# Patient Record
Sex: FEMALE | Race: WHITE | NOT HISPANIC OR LATINO | Employment: OTHER | URBAN - METROPOLITAN AREA
[De-identification: names, ages, dates, MRNs, and addresses within clinical notes are randomized per-mention and may not be internally consistent; named-entity substitution may affect disease eponyms.]

---

## 2017-08-17 ENCOUNTER — TRANSCRIBE ORDERS (OUTPATIENT)
Dept: ADMINISTRATIVE | Facility: HOSPITAL | Age: 72
End: 2017-08-17

## 2017-08-17 DIAGNOSIS — Z86.73 HX OF TIA (TRANSIENT ISCHEMIC ATTACK) AND STROKE: ICD-10-CM

## 2017-08-17 DIAGNOSIS — G93.9 BRAIN LESION: Primary | ICD-10-CM

## 2017-08-24 ENCOUNTER — HOSPITAL ENCOUNTER (OUTPATIENT)
Dept: MRI IMAGING | Facility: HOSPITAL | Age: 72
Discharge: HOME | End: 2017-08-24
Attending: INTERNAL MEDICINE | Admitting: INTERNAL MEDICINE

## 2017-08-24 DIAGNOSIS — Z86.73 HX OF TIA (TRANSIENT ISCHEMIC ATTACK) AND STROKE: ICD-10-CM

## 2017-08-24 DIAGNOSIS — G93.9 BRAIN LESION: ICD-10-CM

## 2017-08-24 LAB — CREAT BLDA-MCNC: 1.3 MG/DL (ref 0.6–1.3)

## 2017-08-24 PROCEDURE — 0 GADOBENATE DIMEGLUMINE 529 MG/ML SOLUTION: Performed by: INTERNAL MEDICINE

## 2017-08-24 PROCEDURE — 82565 ASSAY OF CREATININE: CPT

## 2017-08-24 PROCEDURE — A9577 INJ MULTIHANCE: HCPCS | Performed by: INTERNAL MEDICINE

## 2017-08-24 PROCEDURE — 70553 MRI BRAIN STEM W/O & W/DYE: CPT

## 2017-08-24 RX ADMIN — GADOBENATE DIMEGLUMINE 18 ML: 529 INJECTION, SOLUTION INTRAVENOUS at 09:21

## 2017-08-25 ENCOUNTER — EPISODE CHANGES (OUTPATIENT)
Dept: CASE MANAGEMENT | Facility: OTHER | Age: 72
End: 2017-08-25

## 2017-10-13 ENCOUNTER — EPISODE CHANGES (OUTPATIENT)
Dept: CASE MANAGEMENT | Facility: OTHER | Age: 72
End: 2017-10-13

## 2017-11-14 ENCOUNTER — EPISODE CHANGES (OUTPATIENT)
Dept: CASE MANAGEMENT | Facility: OTHER | Age: 72
End: 2017-11-14

## 2018-02-19 ENCOUNTER — EPISODE CHANGES (OUTPATIENT)
Dept: CASE MANAGEMENT | Facility: OTHER | Age: 73
End: 2018-02-19

## 2019-04-11 ENCOUNTER — APPOINTMENT (OUTPATIENT)
Dept: GENERAL RADIOLOGY | Facility: HOSPITAL | Age: 74
End: 2019-04-11

## 2019-04-11 ENCOUNTER — HOSPITAL ENCOUNTER (EMERGENCY)
Facility: HOSPITAL | Age: 74
Discharge: HOME | End: 2019-04-11
Attending: EMERGENCY MEDICINE | Admitting: EMERGENCY MEDICINE

## 2019-04-11 VITALS
RESPIRATION RATE: 16 BRPM | TEMPERATURE: 99.6 F | OXYGEN SATURATION: 94 % | HEIGHT: 63 IN | DIASTOLIC BLOOD PRESSURE: 65 MMHG | WEIGHT: 182.4 LBS | SYSTOLIC BLOOD PRESSURE: 128 MMHG | BODY MASS INDEX: 32.32 KG/M2 | HEART RATE: 61 BPM

## 2019-04-11 DIAGNOSIS — R11.10 VOMITING AND DIARRHEA: Primary | ICD-10-CM

## 2019-04-11 DIAGNOSIS — R19.7 VOMITING AND DIARRHEA: Primary | ICD-10-CM

## 2019-04-11 LAB
ALBUMIN SERPL-MCNC: 4.5 G/DL (ref 3.5–5.2)
ALBUMIN/GLOB SERPL: 1.5 G/DL
ALP SERPL-CCNC: 105 U/L (ref 39–117)
ALT SERPL W P-5'-P-CCNC: 12 U/L (ref 1–33)
ANION GAP SERPL CALCULATED.3IONS-SCNC: 15.3 MMOL/L
AST SERPL-CCNC: 14 U/L (ref 1–32)
BACTERIA UR QL AUTO: NORMAL /HPF
BASOPHILS # BLD AUTO: 0.03 10*3/MM3 (ref 0–0.2)
BASOPHILS NFR BLD AUTO: 0.4 % (ref 0–1.5)
BILIRUB SERPL-MCNC: 0.6 MG/DL (ref 0.2–1.2)
BILIRUB UR QL STRIP: NEGATIVE
BUN BLD-MCNC: 29 MG/DL (ref 8–23)
BUN/CREAT SERPL: 21.8 (ref 7–25)
CALCIUM SPEC-SCNC: 9.1 MG/DL (ref 8.6–10.5)
CHLORIDE SERPL-SCNC: 96 MMOL/L (ref 98–107)
CLARITY UR: CLEAR
CO2 SERPL-SCNC: 27.7 MMOL/L (ref 22–29)
COLOR UR: YELLOW
CREAT BLD-MCNC: 1.33 MG/DL (ref 0.57–1)
DEPRECATED RDW RBC AUTO: 44.9 FL (ref 37–54)
EOSINOPHIL # BLD AUTO: 0.14 10*3/MM3 (ref 0–0.4)
EOSINOPHIL NFR BLD AUTO: 1.8 % (ref 0.3–6.2)
ERYTHROCYTE [DISTWIDTH] IN BLOOD BY AUTOMATED COUNT: 14.1 % (ref 12.3–15.4)
GFR SERPL CREATININE-BSD FRML MDRD: 39 ML/MIN/1.73
GLOBULIN UR ELPH-MCNC: 3.1 GM/DL
GLUCOSE BLD-MCNC: 134 MG/DL (ref 65–99)
GLUCOSE UR STRIP-MCNC: NEGATIVE MG/DL
HCT VFR BLD AUTO: 43.7 % (ref 34–46.6)
HGB BLD-MCNC: 14.1 G/DL (ref 12–15.9)
HGB UR QL STRIP.AUTO: NEGATIVE
HOLD SPECIMEN: NORMAL
HOLD SPECIMEN: NORMAL
HYALINE CASTS UR QL AUTO: NORMAL /LPF
IMM GRANULOCYTES # BLD AUTO: 0.02 10*3/MM3 (ref 0–0.05)
IMM GRANULOCYTES NFR BLD AUTO: 0.3 % (ref 0–0.5)
KETONES UR QL STRIP: NEGATIVE
LEUKOCYTE ESTERASE UR QL STRIP.AUTO: ABNORMAL
LIPASE SERPL-CCNC: 19 U/L (ref 13–60)
LYMPHOCYTES # BLD AUTO: 0.8 10*3/MM3 (ref 0.7–3.1)
LYMPHOCYTES NFR BLD AUTO: 10.1 % (ref 19.6–45.3)
MCH RBC QN AUTO: 28.1 PG (ref 26.6–33)
MCHC RBC AUTO-ENTMCNC: 32.3 G/DL (ref 31.5–35.7)
MCV RBC AUTO: 87.2 FL (ref 79–97)
MONOCYTES # BLD AUTO: 0.48 10*3/MM3 (ref 0.1–0.9)
MONOCYTES NFR BLD AUTO: 6.1 % (ref 5–12)
NEUTROPHILS # BLD AUTO: 6.44 10*3/MM3 (ref 1.4–7)
NEUTROPHILS NFR BLD AUTO: 81.3 % (ref 42.7–76)
NITRITE UR QL STRIP: NEGATIVE
NRBC BLD AUTO-RTO: 0 /100 WBC (ref 0–0)
PH UR STRIP.AUTO: 7 [PH] (ref 5–8)
PLATELET # BLD AUTO: 330 10*3/MM3 (ref 140–450)
PMV BLD AUTO: 11.1 FL (ref 6–12)
POTASSIUM BLD-SCNC: 3.5 MMOL/L (ref 3.5–5.2)
PROT SERPL-MCNC: 7.6 G/DL (ref 6–8.5)
PROT UR QL STRIP: NEGATIVE
RBC # BLD AUTO: 5.01 10*6/MM3 (ref 3.77–5.28)
RBC # UR: NORMAL /HPF
REF LAB TEST METHOD: NORMAL
SODIUM BLD-SCNC: 139 MMOL/L (ref 136–145)
SP GR UR STRIP: 1.02 (ref 1–1.03)
SQUAMOUS #/AREA URNS HPF: NORMAL /HPF
TROPONIN T SERPL-MCNC: <0.01 NG/ML (ref 0–0.03)
UROBILINOGEN UR QL STRIP: ABNORMAL
WBC NRBC COR # BLD: 7.91 10*3/MM3 (ref 3.4–10.8)
WBC UR QL AUTO: NORMAL /HPF
WHOLE BLOOD HOLD SPECIMEN: NORMAL
WHOLE BLOOD HOLD SPECIMEN: NORMAL

## 2019-04-11 PROCEDURE — 81001 URINALYSIS AUTO W/SCOPE: CPT | Performed by: EMERGENCY MEDICINE

## 2019-04-11 PROCEDURE — 96374 THER/PROPH/DIAG INJ IV PUSH: CPT

## 2019-04-11 PROCEDURE — 85025 COMPLETE CBC W/AUTO DIFF WBC: CPT | Performed by: EMERGENCY MEDICINE

## 2019-04-11 PROCEDURE — 93010 ELECTROCARDIOGRAM REPORT: CPT | Performed by: INTERNAL MEDICINE

## 2019-04-11 PROCEDURE — 71046 X-RAY EXAM CHEST 2 VIEWS: CPT

## 2019-04-11 PROCEDURE — 93005 ELECTROCARDIOGRAM TRACING: CPT | Performed by: EMERGENCY MEDICINE

## 2019-04-11 PROCEDURE — 99284 EMERGENCY DEPT VISIT MOD MDM: CPT

## 2019-04-11 PROCEDURE — 25010000002 ONDANSETRON PER 1 MG: Performed by: EMERGENCY MEDICINE

## 2019-04-11 PROCEDURE — 83690 ASSAY OF LIPASE: CPT | Performed by: EMERGENCY MEDICINE

## 2019-04-11 PROCEDURE — 96361 HYDRATE IV INFUSION ADD-ON: CPT

## 2019-04-11 PROCEDURE — 84484 ASSAY OF TROPONIN QUANT: CPT | Performed by: EMERGENCY MEDICINE

## 2019-04-11 PROCEDURE — 93005 ELECTROCARDIOGRAM TRACING: CPT

## 2019-04-11 PROCEDURE — 80053 COMPREHEN METABOLIC PANEL: CPT | Performed by: EMERGENCY MEDICINE

## 2019-04-11 RX ORDER — ONDANSETRON 4 MG/1
4 TABLET, ORALLY DISINTEGRATING ORAL EVERY 8 HOURS PRN
Qty: 10 TABLET | Refills: 0 | Status: SHIPPED | OUTPATIENT
Start: 2019-04-11 | End: 2021-10-11

## 2019-04-11 RX ORDER — MELATONIN 200 MCG
3 TABLET ORAL NIGHTLY
COMMUNITY

## 2019-04-11 RX ORDER — ONDANSETRON 2 MG/ML
4 INJECTION INTRAMUSCULAR; INTRAVENOUS ONCE
Status: COMPLETED | OUTPATIENT
Start: 2019-04-11 | End: 2019-04-11

## 2019-04-11 RX ORDER — CETIRIZINE HYDROCHLORIDE, PSEUDOEPHEDRINE HYDROCHLORIDE 5; 120 MG/1; MG/1
1 TABLET, FILM COATED, EXTENDED RELEASE ORAL 2 TIMES DAILY
COMMUNITY

## 2019-04-11 RX ORDER — GUAIFENESIN AND DEXTROMETHORPHAN HYDROBROMIDE 600; 30 MG/1; MG/1
TABLET, EXTENDED RELEASE ORAL DAILY
COMMUNITY
End: 2021-10-11

## 2019-04-11 RX ORDER — ASPIRIN 81 MG/1
81 TABLET ORAL DAILY
COMMUNITY
End: 2021-10-14 | Stop reason: HOSPADM

## 2019-04-11 RX ORDER — LOSARTAN POTASSIUM AND HYDROCHLOROTHIAZIDE 25; 100 MG/1; MG/1
1 TABLET ORAL DAILY
COMMUNITY

## 2019-04-11 RX ORDER — AMLODIPINE BESYLATE 10 MG/1
10 TABLET ORAL DAILY
COMMUNITY

## 2019-04-11 RX ORDER — SODIUM CHLORIDE 0.9 % (FLUSH) 0.9 %
10 SYRINGE (ML) INJECTION AS NEEDED
Status: DISCONTINUED | OUTPATIENT
Start: 2019-04-11 | End: 2019-04-12 | Stop reason: HOSPADM

## 2019-04-11 RX ORDER — ESZOPICLONE 3 MG/1
3 TABLET, FILM COATED ORAL NIGHTLY
COMMUNITY

## 2019-04-11 RX ADMIN — ONDANSETRON 4 MG: 2 INJECTION INTRAMUSCULAR; INTRAVENOUS at 22:42

## 2019-04-11 RX ADMIN — SODIUM CHLORIDE 1000 ML: 9 INJECTION, SOLUTION INTRAVENOUS at 22:42

## 2019-04-12 NOTE — ED PROVIDER NOTES
EMERGENCY DEPARTMENT ENCOUNTER    Room Number:  15/15  Date seen:  4/11/2019  Time seen: 9:24 PM  PCP: Soila Young MD  Historian: patient      HPI:  Chief Complaint: N/V  Context: Leandra Wesley is a 73 y.o. female who presents to the ED c/o N/V that began yesterday around 2000. Pt has had N/V today but states she only had diarrhea last night. Pt also complains of chills and upper abd pain. Pt states last night prior to the sx she had left over Mexican food. Pt denies fever and bloody stools. Pt has not had a BM today. Pt denies ill contacts. Pt has had a cholecystectomy. Pt has hx of stroke but denies pertinent cardiac hx. Family at bedside.      Pain Location: abd  Intensity/Severity: moderate  Duration: one day  Onset quality: gradual  Timing: constant  Progression: unchanged  Previous Episodes: none  Treatment before arrival: none  Associated Symptoms: diarrhea, abd pain, chills          PAST MEDICAL HISTORY  Active Ambulatory Problems     Diagnosis Date Noted   • No Active Ambulatory Problems     Resolved Ambulatory Problems     Diagnosis Date Noted   • No Resolved Ambulatory Problems     Past Medical History:   Diagnosis Date   • Carotid stenosis, bilateral    • DDD (degenerative disc disease), cervical    • Diverticular disease    • GERD (gastroesophageal reflux disease)    • Hypertension    • IBS (irritable bowel syndrome)    • Intestine disorder    • Kidney stone    • Migraine    • Acevedo's neuroma of right foot    • Stroke (CMS/HCC)          PAST SURGICAL HISTORY  Past Surgical History:   Procedure Laterality Date   • BLADDER SURGERY     • CHOLECYSTECTOMY     • HYSTERECTOMY     • TONSILLECTOMY           FAMILY HISTORY  History reviewed. No pertinent family history.      SOCIAL HISTORY  Social History     Socioeconomic History   • Marital status:      Spouse name: Not on file   • Number of children: Not on file   • Years of education: Not on file   • Highest education level: Not on file   Tobacco  Use   • Smoking status: Former Smoker   • Tobacco comment: smoked as teen   Substance and Sexual Activity   • Alcohol use: No     Frequency: Never   • Drug use: No   • Sexual activity: Defer         ALLERGIES  Eggs or egg-derived products; Iodine; Penicillins; and Sulfa antibiotics        REVIEW OF SYSTEMS  Review of Systems   Constitutional: Positive for chills. Negative for diaphoresis and fever.   HENT: Negative for congestion.    Eyes: Negative for visual disturbance.   Respiratory: Negative for shortness of breath.    Cardiovascular: Negative for palpitations.   Gastrointestinal: Positive for abdominal pain (upper), diarrhea, nausea and vomiting. Negative for blood in stool.   Endocrine: Negative for polyuria.   Genitourinary: Negative for flank pain.   Musculoskeletal: Negative for joint swelling.   Skin: Negative for wound.   Neurological: Negative for seizures.   Hematological: Negative for adenopathy.   Psychiatric/Behavioral: Negative for sleep disturbance.            PHYSICAL EXAM  ED Triage Vitals   Temp Heart Rate Resp BP SpO2   04/11/19 2108 04/11/19 2108 04/11/19 2108 04/11/19 2119 04/11/19 2108   99.6 °F (37.6 °C) 113 18 130/96 98 %      Temp src Heart Rate Source Patient Position BP Location FiO2 (%)   04/11/19 2108 -- -- -- --   Tympanic             GENERAL: no acute distress  HENT: nares patent  EYES: no scleral icterus  CV: regular rhythm, normal rate  RESPIRATORY: normal effort, CTAB  ABDOMEN: soft with mild epigastric tenderness, no rebound or guarding, nondistended  MUSCULOSKELETAL: no deformity  NEURO: alert, moves all extremities, follows commands  SKIN: warm, dry    Vital signs and nursing notes reviewed.          LAB RESULTS  Recent Results (from the past 24 hour(s))   Comprehensive Metabolic Panel    Collection Time: 04/11/19  9:23 PM   Result Value Ref Range    Glucose 134 (H) 65 - 99 mg/dL    BUN 29 (H) 8 - 23 mg/dL    Creatinine 1.33 (H) 0.57 - 1.00 mg/dL    Sodium 139 136 - 145 mmol/L     Potassium 3.5 3.5 - 5.2 mmol/L    Chloride 96 (L) 98 - 107 mmol/L    CO2 27.7 22.0 - 29.0 mmol/L    Calcium 9.1 8.6 - 10.5 mg/dL    Total Protein 7.6 6.0 - 8.5 g/dL    Albumin 4.50 3.50 - 5.20 g/dL    ALT (SGPT) 12 1 - 33 U/L    AST (SGOT) 14 1 - 32 U/L    Alkaline Phosphatase 105 39 - 117 U/L    Total Bilirubin 0.6 0.2 - 1.2 mg/dL    eGFR Non African Amer 39 (L) >60 mL/min/1.73    Globulin 3.1 gm/dL    A/G Ratio 1.5 g/dL    BUN/Creatinine Ratio 21.8 7.0 - 25.0    Anion Gap 15.3 mmol/L   Lipase    Collection Time: 04/11/19  9:23 PM   Result Value Ref Range    Lipase 19 13 - 60 U/L   Troponin    Collection Time: 04/11/19  9:23 PM   Result Value Ref Range    Troponin T <0.010 0.000 - 0.030 ng/mL   Light Blue Top    Collection Time: 04/11/19  9:23 PM   Result Value Ref Range    Extra Tube hold for add-on    Green Top (Gel)    Collection Time: 04/11/19  9:23 PM   Result Value Ref Range    Extra Tube Hold for add-ons.    Lavender Top    Collection Time: 04/11/19  9:23 PM   Result Value Ref Range    Extra Tube hold for add-on    Gold Top - SST    Collection Time: 04/11/19  9:23 PM   Result Value Ref Range    Extra Tube Hold for add-ons.    CBC Auto Differential    Collection Time: 04/11/19  9:23 PM   Result Value Ref Range    WBC 7.91 3.40 - 10.80 10*3/mm3    RBC 5.01 3.77 - 5.28 10*6/mm3    Hemoglobin 14.1 12.0 - 15.9 g/dL    Hematocrit 43.7 34.0 - 46.6 %    MCV 87.2 79.0 - 97.0 fL    MCH 28.1 26.6 - 33.0 pg    MCHC 32.3 31.5 - 35.7 g/dL    RDW 14.1 12.3 - 15.4 %    RDW-SD 44.9 37.0 - 54.0 fl    MPV 11.1 6.0 - 12.0 fL    Platelets 330 140 - 450 10*3/mm3    Neutrophil % 81.3 (H) 42.7 - 76.0 %    Lymphocyte % 10.1 (L) 19.6 - 45.3 %    Monocyte % 6.1 5.0 - 12.0 %    Eosinophil % 1.8 0.3 - 6.2 %    Basophil % 0.4 0.0 - 1.5 %    Immature Grans % 0.3 0.0 - 0.5 %    Neutrophils, Absolute 6.44 1.40 - 7.00 10*3/mm3    Lymphocytes, Absolute 0.80 0.70 - 3.10 10*3/mm3    Monocytes, Absolute 0.48 0.10 - 0.90 10*3/mm3     Eosinophils, Absolute 0.14 0.00 - 0.40 10*3/mm3    Basophils, Absolute 0.03 0.00 - 0.20 10*3/mm3    Immature Grans, Absolute 0.02 0.00 - 0.05 10*3/mm3    nRBC 0.0 0.0 - 0.0 /100 WBC   Urinalysis With Microscopic If Indicated (No Culture) - Urine, Clean Catch    Collection Time: 04/11/19 10:44 PM   Result Value Ref Range    Color, UA Yellow Yellow, Straw    Appearance, UA Clear Clear    pH, UA 7.0 5.0 - 8.0    Specific Gravity, UA 1.020 1.005 - 1.030    Glucose, UA Negative Negative    Ketones, UA Negative Negative    Bilirubin, UA Negative Negative    Blood, UA Negative Negative    Protein, UA Negative Negative    Leuk Esterase, UA Trace (A) Negative    Nitrite, UA Negative Negative    Urobilinogen, UA 0.2 E.U./dL 0.2 - 1.0 E.U./dL   Urinalysis, Microscopic Only - Urine, Clean Catch    Collection Time: 04/11/19 10:44 PM   Result Value Ref Range    RBC, UA 0-2 None Seen, 0-2 /HPF    WBC, UA 0-2 None Seen, 0-2 /HPF    Bacteria, UA None Seen None Seen /HPF    Squamous Epithelial Cells, UA 0-2 None Seen, 0-2 /HPF    Hyaline Casts, UA None Seen None Seen /LPF    Methodology Automated Microscopy        Ordered the above labs and reviewed the results.        RADIOLOGY  Xr Chest 2 View    Result Date: 4/11/2019  PA AND LATERAL CHEST X-RAY  CLINICAL HISTORY: Upper Abdominal Pain triage protocol  COMPARISON: 10/27/2011.  FINDINGS: PA and lateral views of the chest were obtained. The lungs are well expanded and appear clear. Heart size and pulmonary vascularity are normal. No pleural effusions.        1. No active disease.  This report was finalized on 4/11/2019 9:42 PM by Micah Garcia M.D.        Ordered the above noted radiological studies. Reviewed by me in PACS.          PROCEDURES  Procedures        EKG:           EKG time: 2111  Rhythm/Rate: NSR 93 BPM  P waves and AZ: normal  QRS, axis: normal axis   ST and T waves: inferior and lateral ST depression of 1mm, no significant ST elevation     Interpreted  Contemporaneously by me, independently viewed  Slightly more pronounced compared to prior 9/21/11      EKG          EKG time: 2247  Rhythm/Rate: NSR 79 BPM  P waves and DC: normal  QRS, axis: normal axis   ST and T waves: inferior and lateral ST depression     Interpreted Contemporaneously by me, independently viewed  slight improved of inferior and lateral ST depression compared to prior earlier today      MEDICATIONS GIVEN IN ER  Medications   sodium chloride 0.9 % flush 10 mL (not administered)   sodium chloride 0.9 % bolus 1,000 mL (1,000 mL Intravenous New Bag 4/11/19 2242)   ondansetron (ZOFRAN) injection 4 mg (4 mg Intravenous Given 4/11/19 2242)                   PROGRESS AND CONSULTS     2109-Ordered CXR, lab work, and EKG for further evaluation.     2227-Ordered repeat EKG for further evaluation.     2320-patient updated on labs.  She has p.o. challenge successfully.  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  I explained the importance of continued oral hydration and prescribed Zofran as needed.  She is appropriate for discharge home at this time with return precautions, PCP follow-up.    MEDICAL DECISION MAKING        MDM  Number of Diagnoses or Management Options     Amount and/or Complexity of Data Reviewed  Clinical lab tests: reviewed and ordered (troponin<0.010  WBC-7.91  creatinine-1.33)  Tests in the radiology section of CPT®: reviewed and ordered (CXR-negative acute)  Tests in the medicine section of CPT®: reviewed and ordered (See EKG procedure note)  Decide to obtain previous medical records or to obtain history from someone other than the patient: yes  Independent visualization of images, tracings, or specimens: yes               DIAGNOSIS  Final diagnoses:   Vomiting and diarrhea         DISPOSITION  Discharge            Latest Documented Vital  Signs:  As of 11:25 PM  BP- 130/70 HR- 80 Temp- 99.6 °F (37.6 °C) (Tympanic) O2 sat- 100%        --  Documentation assistance provided by martin Herbert for Dr. JUDIE Hsieh MD.  Information recorded by the scribe was done at my direction and has been verified and validated by me.                      Yolanda Herbert  04/11/19 [id number]       Tim Hsieh MD  04/11/19 [id number]

## 2019-04-12 NOTE — ED NOTES
Pt to ED via PV. Pt c/o epigastric pain, nausea and vomiting that started last night.      Sherri Mejia, RN  04/11/19 [id number]

## 2021-01-04 ENCOUNTER — TRANSCRIBE ORDERS (OUTPATIENT)
Dept: ADMINISTRATIVE | Facility: HOSPITAL | Age: 76
End: 2021-01-04

## 2021-01-04 DIAGNOSIS — M54.50 LOW BACK PAIN, UNSPECIFIED BACK PAIN LATERALITY, UNSPECIFIED CHRONICITY, UNSPECIFIED WHETHER SCIATICA PRESENT: Primary | ICD-10-CM

## 2021-01-09 ENCOUNTER — HOSPITAL ENCOUNTER (OUTPATIENT)
Dept: MRI IMAGING | Facility: HOSPITAL | Age: 76
Discharge: HOME | End: 2021-01-09
Admitting: INTERNAL MEDICINE

## 2021-01-09 DIAGNOSIS — M54.50 LOW BACK PAIN, UNSPECIFIED BACK PAIN LATERALITY, UNSPECIFIED CHRONICITY, UNSPECIFIED WHETHER SCIATICA PRESENT: ICD-10-CM

## 2021-01-09 PROCEDURE — 72148 MRI LUMBAR SPINE W/O DYE: CPT

## 2021-03-09 DIAGNOSIS — Z23 IMMUNIZATION DUE: ICD-10-CM

## 2021-05-06 ENCOUNTER — TRANSCRIBE ORDERS (OUTPATIENT)
Dept: ADMINISTRATIVE | Facility: HOSPITAL | Age: 76
End: 2021-05-06

## 2021-05-06 DIAGNOSIS — R27.0 ATAXIA: Primary | ICD-10-CM

## 2021-05-06 DIAGNOSIS — Z86.73 HX OF COMPLETED STROKE: ICD-10-CM

## 2021-06-02 ENCOUNTER — HOSPITAL ENCOUNTER (EMERGENCY)
Facility: HOSPITAL | Age: 76
Discharge: HOME | End: 2021-06-02
Attending: EMERGENCY MEDICINE | Admitting: EMERGENCY MEDICINE

## 2021-06-02 ENCOUNTER — APPOINTMENT (OUTPATIENT)
Dept: GENERAL RADIOLOGY | Facility: HOSPITAL | Age: 76
End: 2021-06-02

## 2021-06-02 VITALS
WEIGHT: 182 LBS | SYSTOLIC BLOOD PRESSURE: 134 MMHG | BODY MASS INDEX: 33.49 KG/M2 | HEART RATE: 82 BPM | TEMPERATURE: 97.7 F | OXYGEN SATURATION: 98 % | DIASTOLIC BLOOD PRESSURE: 90 MMHG | RESPIRATION RATE: 18 BRPM | HEIGHT: 62 IN

## 2021-06-02 DIAGNOSIS — S30.0XXA LUMBAR CONTUSION, INITIAL ENCOUNTER: Primary | ICD-10-CM

## 2021-06-02 DIAGNOSIS — S30.0XXA CONTUSION OF SACRUM, INITIAL ENCOUNTER: ICD-10-CM

## 2021-06-02 PROCEDURE — 99283 EMERGENCY DEPT VISIT LOW MDM: CPT

## 2021-06-02 PROCEDURE — 72110 X-RAY EXAM L-2 SPINE 4/>VWS: CPT

## 2021-06-02 PROCEDURE — 72220 X-RAY EXAM SACRUM TAILBONE: CPT

## 2021-06-02 RX ORDER — HYDROCODONE BITARTRATE AND ACETAMINOPHEN 7.5; 325 MG/1; MG/1
1 TABLET ORAL ONCE
Status: COMPLETED | OUTPATIENT
Start: 2021-06-02 | End: 2021-06-02

## 2021-06-02 RX ORDER — CYCLOBENZAPRINE HCL 10 MG
10 TABLET ORAL ONCE
Status: DISCONTINUED | OUTPATIENT
Start: 2021-06-02 | End: 2021-06-02

## 2021-06-02 RX ORDER — BACLOFEN 10 MG/1
5 TABLET ORAL ONCE
Status: COMPLETED | OUTPATIENT
Start: 2021-06-02 | End: 2021-06-02

## 2021-06-02 RX ORDER — BACLOFEN 5 MG/1
5 TABLET ORAL 2 TIMES DAILY
Qty: 14 TABLET | Refills: 0 | Status: SHIPPED | OUTPATIENT
Start: 2021-06-02 | End: 2021-10-11

## 2021-06-02 RX ADMIN — HYDROCODONE BITARTRATE AND ACETAMINOPHEN 1 TABLET: 7.5; 325 TABLET ORAL at 20:48

## 2021-06-02 RX ADMIN — BACLOFEN 5 MG: 10 TABLET ORAL at 20:48

## 2021-06-02 NOTE — ED TRIAGE NOTES
Pt comes to ER after slipping and falling prior to arrival, pt denies hitting her head or LOC. Complaining of tailbone pain. Pt and RN wearing mask upon triage

## 2021-06-02 NOTE — ED PROVIDER NOTES
EMERGENCY DEPARTMENT ENCOUNTER    Room Number:  04/04  Date seen:  6/2/2021  Time seen: 19:49 EDT  PCP: Soila Young MD  Historian:     HPI:  Chief complaint:***  A complete HPI/ROS/PMH/PSH/SH/FH are unobtainable due to:   Context:Leandra Wesley is a 75 y.o. female who presents to the ED with c/o ***    Timing:***  Duration: ***  Location:***  Radiation:***  Quality:***  Intensity/Severity:***  Associated Symptoms:***  Aggravating Factors:***  Alleviating Factors:***  Previous Episodes:***  Treatment before arrival:***    MEDICAL RECORD REVIEW  ***    ALLERGIES  Eggs or egg-derived products, Iodine, Penicillins, and Sulfa antibiotics    PAST MEDICAL HISTORY  Active Ambulatory Problems     Diagnosis Date Noted   • No Active Ambulatory Problems     Resolved Ambulatory Problems     Diagnosis Date Noted   • No Resolved Ambulatory Problems     Past Medical History:   Diagnosis Date   • Carotid stenosis, bilateral    • DDD (degenerative disc disease), cervical    • Diverticular disease    • GERD (gastroesophageal reflux disease)    • Hypertension    • IBS (irritable bowel syndrome)    • Intestine disorder    • Kidney stone    • Migraine    • Acevedo's neuroma of right foot    • Stroke (CMS/HCC)        PAST SURGICAL HISTORY  Past Surgical History:   Procedure Laterality Date   • BLADDER SURGERY     • CHOLECYSTECTOMY     • HYSTERECTOMY     • TONSILLECTOMY         FAMILY HISTORY  No family history on file.    SOCIAL HISTORY  Social History     Socioeconomic History   • Marital status:      Spouse name: Not on file   • Number of children: Not on file   • Years of education: Not on file   • Highest education level: Not on file   Tobacco Use   • Smoking status: Former Smoker   • Tobacco comment: smoked as teen   Substance and Sexual Activity   • Alcohol use: No   • Drug use: No   • Sexual activity: Defer       REVIEW OF SYSTEMS  Review of Systems    All systems reviewed and negative except for those discussed in HPI.      PHYSICAL EXAM    ED Triage Vitals   Temp Heart Rate Resp BP SpO2   06/02/21 1707 06/02/21 1706 06/02/21 1706 06/02/21 1707 06/02/21 1706   97.7 °F (36.5 °C) 87 18 150/86 97 %      Temp src Heart Rate Source Patient Position BP Location FiO2 (%)   -- -- -- -- --            Physical Exam    I have reviewed the triage vital signs and nursing notes.      GENERAL: ***not distressed  HENT: nares patent  EYES: no scleral icterus  NECK: no ROM limitations  CV: regular rhythm, regular rate  RESPIRATORY: normal effort  ABDOMEN: soft  : deferred  MUSCULOSKELETAL: no deformity  NEURO: alert, moves all extremities, follows commands  SKIN: warm, dry    LAB RESULTS  No results found for this or any previous visit (from the past 24 hour(s)).      RADIOLOGY RESULTS  XR Spine Lumbar Complete 4+VW    (Results Pending)   XR Sacrum & Coccyx    (Results Pending)         PROGRESS, DATA ANALYSIS, CONSULTS AND MEDICAL DECISION MAKING  All labs have been independently reviewed by me.  All radiology studies have been reviewed by me and discussed with radiologist dictating the report.  EKG's independently viewed and interpreted by me unless stated otherwise. Discussion below represents my analysis of pertinent findings related to patient's condition, differential diagnosis, treatment plan and final disposition.          *** Reviewed pt's history and workup with  ***.  After a bedside evaluation,  *** agrees with the plan of care.    ***(FOR DISCHARGE)The patient's history, physical exam, and lab findings were discussed with the physician, who also performed a face to face history and physical exam.  I discussed all results and noted any abnormalities with patient.  Discussed absoute need to recheck abnormalities with their family physician.  I answered any of the patient's questions.  Discussed plan for discharge, as there is no emergent indication for admission.  Pt is agreeable and understands need for follow up and repeat  "testing.  Pt is aware that discharge does not mean that nothing is wrong but it indicates no emergency is present and they must continue care with their family physician.  Pt is discharged with instructions to follow up with primary care doctor to have their blood pressure rechecked.     ***(FOR AMA)The patient would like to leave Against Medical Advice.  I have explained the risks of leaving prior to completion of evaluation.  The patient verbally agrees to accept these risks, including worsening of condition, complications and death.  They were of sound mind and mentally stable to make decisions at the time of their AMA departure.  I have encouarged the patient to return the ED at any time if symptoms persist or for any additional concerns.  I verbally advised them to follow up with their primary physician or local health clinic.    ***(FOR ADMIT) Based on the patient's lab findings and presenting symptoms, the doctor and I feel it is appropriate to admit the patient for further management, evaluation, and treatment.  I have discussed this with the admitting team.  I have also discussed this with the patient/family.  They are in agreement with admission.          Disposition vitals:  /86   Pulse 87   Temp 97.7 °F (36.5 °C)   Resp 18   Ht 157.5 cm (62\")   Wt 82.6 kg (182 lb)   SpO2 97%   BMI 33.29 kg/m²       DIAGNOSIS  Final diagnoses:   None       FOLLOW UP   No follow-up provider specified.    "

## 2021-06-03 NOTE — ED PROVIDER NOTES
MD ATTESTATION NOTE    The CHRISTOS and I have discussed this patient's history, physical exam, and treatment plan.  I have reviewed the documentation and personally had a face to face interaction with the patient. I affirm the documentation and agree with the treatment and plan.  The attached note describes my personal findings.    75-year-old female who presents after mechanical fall at home where she fell on her tailbone.  She had severe pain at first, which is improved over time but she does have pain sitting and walking.  There is no deformity or instability of the pelvis.  There are no deformities of the hips or legs, and her plain films are negative.  She safe for outpatient discharge and follow-up     Colton Kwok MD  06/02/21 [id number]

## 2021-06-03 NOTE — DISCHARGE INSTRUCTIONS
Medications as ordered  For mild to moderate pain you should alternate Tylenol and Motrin every 4-6 hours  Heat or ice to back  Activity as tolerated  Gentle stretching  Follow up with pmd in 5-7 days if symptoms not improving  Return to er for numbness/tingling to legs, loss of bowel/bladder function, increased pain or any new or worsening symptoms

## 2021-06-03 NOTE — ED PROVIDER NOTES
EMERGENCY DEPARTMENT ENCOUNTER    Room Number:  04/04  Date of encounter:  6/2/2021  PCP: Soila Young MD  Historian: Patient      PPE    Patient was placed in face mask in first look. Patient was wearing facemask when I entered the room and throughout our encounter. I wore full protective equipment throughout this patient encounter including a face mask, and gloves. Hand hygiene was performed before donning protective equipment and after removal when leaving the room.        HPI:  Chief Complaint: Low back pain  A complete HPI/ROS/PMH/PSH/SH/FH are unobtainable due to: Nothing    Context: Leandra Wesley is a 75 y.o. female who arrives to the ED via private vehicle from home.  Patient presents with c/o mild, constant, achy low back, tailbone pain status post a fall prior to arrival.  Patient states that she was going to get the phone when she slipped on a wet area on the floor.  She states she landed on her butt and was able to get herself up off the floor.  Patient denies hitting her head, loss of bowel or bladder function, extremity injuries, dizziness, chest pain or shortness of breath.  She states she always has numbness and tingling to her bilateral lower extremities and this is not changed since the fall.  Patient states that nothing makes the symptoms better and movement worsens symptoms.          PAST MEDICAL HISTORY  Active Ambulatory Problems     Diagnosis Date Noted   • No Active Ambulatory Problems     Resolved Ambulatory Problems     Diagnosis Date Noted   • No Resolved Ambulatory Problems     Past Medical History:   Diagnosis Date   • Carotid stenosis, bilateral    • DDD (degenerative disc disease), cervical    • Diverticular disease    • GERD (gastroesophageal reflux disease)    • Hypertension    • IBS (irritable bowel syndrome)    • Intestine disorder    • Kidney stone    • Migraine    • Acevedo's neuroma of right foot    • Stroke (CMS/[facility])          PAST SURGICAL HISTORY  Past Surgical History:    Procedure Laterality Date   • BLADDER SURGERY     • CHOLECYSTECTOMY     • HYSTERECTOMY     • TONSILLECTOMY           FAMILY HISTORY  No family history on file.      SOCIAL HISTORY  Social History     Socioeconomic History   • Marital status:      Spouse name: Not on file   • Number of children: Not on file   • Years of education: Not on file   • Highest education level: Not on file   Tobacco Use   • Smoking status: Former Smoker   • Tobacco comment: smoked as teen   Substance and Sexual Activity   • Alcohol use: No   • Drug use: No   • Sexual activity: Defer         ALLERGIES  Eggs or egg-derived products, Iodine, Penicillins, and Sulfa antibiotics        REVIEW OF SYSTEMS  Review of Systems     All systems reviewed and negative except for those discussed in HPI.        PHYSICAL EXAM    ED Triage Vitals   Temp Heart Rate Resp BP SpO2   06/02/21 1707 06/02/21 1706 06/02/21 1706 06/02/21 1707 06/02/21 1706   97.7 °F (36.5 °C) 87 18 150/86 97 %       Physical Exam  GENERAL: Well appearing, non-toxic appearing, not distressed  HENT: normocephalic, atraumatic  EYES: no scleral icterus, PERRL  CV: regular rhythm, regular rate, no murmur  RESPIRATORY: normal effort, CTAB  ABDOMEN: soft   MUSCULOSKELETAL: no deformity  No cervical, thoracic or lumbar vertebral tenderness to palpation  No step off or crepitus noted  No cervical, thoracic or lumbar paraspinal tenderness to palpation  Left SI joint tenderness  Negative straight Leg Raises bilaterally  5/5 muscle strength with dorsiflexion and flexion  2+ DP & PT pulses bilaterally  Normal sensation to bilateral lower extremities, no saddle paresthesia  NEURO: alert, moves all extremities, follows commands, mental status normal/baseline  SKIN: warm, dry, no rash   Psych: Appropriate mood and affect  Nursing notes and vital signs reviewed      LAB RESULTS  No results found for this or any previous visit (from the past 24 hour(s)).    Ordered the above labs and  independently reviewed the results.      RADIOLOGY  XR Spine Lumbar Complete 4+VW, XR Sacrum & Coccyx    Result Date: 6/2/2021  LUMBAR SPINE X-RAYS AND SACRUM AND COCCYX X-RAYS  CLINICAL HISTORY: Patient fell. Low back pain and tailbone pain.  Lumbar spine series:  A total 7 views were obtained. There is minimal smooth dextroscoliosis. In addition, there is minimal anterolisthesis of L4 with respect to L5 that is likely due to facet joint arthropathy. The alignment is otherwise unremarkable. All of the vertebral bodies are normal in height. There is no evidence of recent or old fracture. There is mild disc space narrowing at L4-L5. Moderate disc space narrowing is present at L5-S1. The remainder the lumbar disc spaces are normal in height.  Sacrum and coccyx:  2 AP views and a lateral view demonstrate no bony abnormalities. There is no evidence of fracture. The SI joints are symmetric and are intact.  This report was finalized on 6/2/2021 8:20 PM by Dr. Timmy Tejada M.D.        I ordered the above noted radiological studies and viewed the images on the PACS system.       MEDICAL RECORD REVIEW  Medical records reviewed in epic, patient was last seen in this ER March 11, 2019 for nausea and vomiting      PROCEDURES    Procedures        DIFFERENTIAL DIAGNOSIS  Differential Diagnosis for back pain includes but is not limited to the following:  Musculoskeletal pain, contusion, Disc protrusion, Vertebral fracture, Rib fracture, Sciatica, Osteoarthritis, Spinal Stenosis, Kidney stone        PROGRESS, DATA ANALYSIS, CONSULTS, AND MEDICAL DECISION MAKING        ED Course as of Jun 02 2117   Wed Jun 02, 2021 2022 Discussed pertinent information from history and physical exam with patient.  Discussed differential diagnosis and plan for ED evaluation/work-up and treatment including x-rays of the lumbar spine and sacrum and coccyx.  All questions answered.  Patient is agreeable with this plan.  Patient was offered pain  medication however she declined at this time.      [MS]   2032 Reviewed pt's history and workup with Dr. Kwok.  After a bedside evaluation, they agree with the plan of care.          [MS]   2116 Patient updated on unremarkable x-rays done here today.  Discussed with patient that she should apply ice to her low back, tailbone for the next 24 to 48 hours with activity as tolerated.  She will be sent home with a short course of muscle relaxers as needed and was instructed to take Tylenol or ibuprofen.  Patient verbalized understanding and is agreeable to this plan.    [MS]      ED Course User Index  [MS] Katherine Chase APRN     Discussed plan for discharge, as there is no emergent indication for admission. Pt/family is agreeable and understands need for follow up and repeat testing.  Pt is aware that discharge does not mean that nothing is wrong but it indicates no emergency is present that requires admission and they must continue care with follow-up as given below or physician of their choice.   Patient/Family voiced understanding of above instructions.  Patient discharged in stable condition.    DIAGNOSIS  Final diagnoses:   Lumbar contusion, initial encounter   Contusion of sacrum, initial encounter       FOLLOW UP   Soila Young MD  [address]  [facility] [address]  [place] [address]  [phone]    Schedule an appointment as soon as possible for a visit in 1 week  If symptoms worsen      RX     Medication List      New Prescriptions    Baclofen 5 MG tablet  Take 5 mg by mouth 2 (Two) Times a Day.           Where to Get Your Medications      You can get these medications from any pharmacy    Bring a paper prescription for each of these medications  · Baclofen 5 MG tablet           MEDICATIONS GIVEN IN ED    Medications   HYDROcodone-acetaminophen (NORCO) 7.5-325 MG per tablet 1 tablet (1 tablet Oral Given 6/2/21 2048)   baclofen (LIORESAL) tablet 5 mg (5 mg Oral Given 6/2/21 2048)           COURSE &  MEDICAL DECISION MAKING  Any/All labs and Any/All Imaging studies that were ordered were reviewed and are noted above.  Results were reviewed/discussed with the patient and they were also made aware of online access.    Pt also made aware that some labs, such as cultures, will not be resulted during ER visit and follow up with PMD is necessary.        Katherine Chase, APRN  06/02/21 [id number]

## 2021-06-03 NOTE — ED NOTES
"Pt presents to ED with complaints of tailbone pain from fall this AM. Pt states she was trying to get to the phone and slipped. Pt denies hitting head or LOC. Pt aox4. Pt reports 7/10 constant pain. Pt ambulated to ED room, gait slightly unsteady. Pt reports having difficulty ambulating from \"swollen left leg\" that she's had for a few weeks. Pt states she uses a walking stick to walk around at home. Pt reports she's in pain management for 2 bulging disc. VSS. ABC's intact. BLE pulses palpable.    Pt reports she fell a week ago at PT office and had carpet burn on left knee.    This RN wore appropriate PPE. Pt wearing mask. Hand hygiene performed.       Reyes, Laverne, RN  06/02/21 2002       Reyes, Laverne, RN  06/02/21 2004       Reyes, Mena, RN  06/02/21 2012    "

## 2021-06-05 ENCOUNTER — HOSPITAL ENCOUNTER (OUTPATIENT)
Dept: MRI IMAGING | Facility: HOSPITAL | Age: 76
Discharge: HOME | End: 2021-06-05
Admitting: INTERNAL MEDICINE

## 2021-06-05 DIAGNOSIS — Z86.73 HX OF COMPLETED STROKE: ICD-10-CM

## 2021-06-05 DIAGNOSIS — R27.0 ATAXIA: ICD-10-CM

## 2021-06-05 LAB — CREAT BLDA-MCNC: 1.6 MG/DL (ref 0.6–1.3)

## 2021-06-05 PROCEDURE — 82565 ASSAY OF CREATININE: CPT

## 2021-06-05 PROCEDURE — 70553 MRI BRAIN STEM W/O & W/DYE: CPT

## 2021-06-05 PROCEDURE — 0 GADOBENATE DIMEGLUMINE 529 MG/ML SOLUTION: Performed by: INTERNAL MEDICINE

## 2021-06-05 PROCEDURE — A9577 INJ MULTIHANCE: HCPCS | Performed by: INTERNAL MEDICINE

## 2021-06-05 RX ADMIN — GADOBENATE DIMEGLUMINE 17 ML: 529 INJECTION, SOLUTION INTRAVENOUS at 14:01

## 2021-09-28 ENCOUNTER — APPOINTMENT (OUTPATIENT)
Dept: VACCINE CLINIC | Facility: HOSPITAL | Age: 76
End: 2021-09-28

## 2021-10-11 ENCOUNTER — HOSPITAL ENCOUNTER (OUTPATIENT)
Dept: GENERAL RADIOLOGY | Facility: HOSPITAL | Age: 76
Discharge: HOME | End: 2021-10-11

## 2021-10-11 ENCOUNTER — PRE-ADMISSION TESTING (OUTPATIENT)
Dept: PREADMISSION TESTING | Facility: HOSPITAL | Age: 76
End: 2021-10-11

## 2021-10-11 VITALS
HEIGHT: 63 IN | WEIGHT: 180.4 LBS | TEMPERATURE: 98.7 F | HEART RATE: 104 BPM | BODY MASS INDEX: 31.96 KG/M2 | OXYGEN SATURATION: 100 % | SYSTOLIC BLOOD PRESSURE: 123 MMHG | DIASTOLIC BLOOD PRESSURE: 78 MMHG | RESPIRATION RATE: 16 BRPM

## 2021-10-11 LAB
ABO GROUP BLD: NORMAL
ALBUMIN SERPL-MCNC: 4.2 G/DL (ref 3.5–5.2)
ALBUMIN/GLOB SERPL: 1.2 G/DL
ALP SERPL-CCNC: 122 U/L (ref 39–117)
ALT SERPL W P-5'-P-CCNC: 10 U/L (ref 1–33)
ANION GAP SERPL CALCULATED.3IONS-SCNC: 13.2 MMOL/L (ref 5–15)
APTT PPP: 28.7 SECONDS (ref 22.7–35.4)
AST SERPL-CCNC: 12 U/L (ref 1–32)
BACTERIA UR QL AUTO: ABNORMAL /HPF
BILIRUB SERPL-MCNC: 0.5 MG/DL (ref 0–1.2)
BILIRUB UR QL STRIP: NEGATIVE
BLD GP AB SCN SERPL QL: NEGATIVE
BUN SERPL-MCNC: 22 MG/DL (ref 8–23)
BUN/CREAT SERPL: 12.4 (ref 7–25)
CALCIUM SPEC-SCNC: 9.6 MG/DL (ref 8.6–10.5)
CHLORIDE SERPL-SCNC: 96 MMOL/L (ref 98–107)
CLARITY UR: CLEAR
CO2 SERPL-SCNC: 26.8 MMOL/L (ref 22–29)
COLOR UR: YELLOW
CREAT SERPL-MCNC: 1.77 MG/DL (ref 0.57–1)
DEPRECATED RDW RBC AUTO: 40.1 FL (ref 37–54)
ERYTHROCYTE [DISTWIDTH] IN BLOOD BY AUTOMATED COUNT: 13.1 % (ref 12.3–15.4)
GFR SERPL CREATININE-BSD FRML MDRD: 28 ML/MIN/1.73
GLOBULIN UR ELPH-MCNC: 3.6 GM/DL
GLUCOSE SERPL-MCNC: 120 MG/DL (ref 65–99)
GLUCOSE UR STRIP-MCNC: NEGATIVE MG/DL
HCT VFR BLD AUTO: 36.9 % (ref 34–46.6)
HGB BLD-MCNC: 12.2 G/DL (ref 12–15.9)
HGB UR QL STRIP.AUTO: NEGATIVE
HYALINE CASTS UR QL AUTO: ABNORMAL /LPF
INR PPP: 1 (ref 0.9–1.1)
KETONES UR QL STRIP: NEGATIVE
LEUKOCYTE ESTERASE UR QL STRIP.AUTO: ABNORMAL
MCH RBC QN AUTO: 27.7 PG (ref 26.6–33)
MCHC RBC AUTO-ENTMCNC: 33.1 G/DL (ref 31.5–35.7)
MCV RBC AUTO: 83.7 FL (ref 79–97)
NITRITE UR QL STRIP: NEGATIVE
PH UR STRIP.AUTO: 7 [PH] (ref 5–8)
PLATELET # BLD AUTO: 331 10*3/MM3 (ref 140–450)
PMV BLD AUTO: 10.5 FL (ref 6–12)
POTASSIUM SERPL-SCNC: 4.2 MMOL/L (ref 3.5–5.2)
PROT SERPL-MCNC: 7.8 G/DL (ref 6–8.5)
PROT UR QL STRIP: NEGATIVE
PROTHROMBIN TIME: 13 SECONDS (ref 11.7–14.2)
QT INTERVAL: 348 MS
RBC # BLD AUTO: 4.41 10*6/MM3 (ref 3.77–5.28)
RBC # UR: ABNORMAL /HPF
REF LAB TEST METHOD: ABNORMAL
RH BLD: POSITIVE
SARS-COV-2 ORF1AB RESP QL NAA+PROBE: NOT DETECTED
SODIUM SERPL-SCNC: 136 MMOL/L (ref 136–145)
SP GR UR STRIP: 1.01 (ref 1–1.03)
SQUAMOUS #/AREA URNS HPF: ABNORMAL /HPF
T&S EXPIRATION DATE: NORMAL
UROBILINOGEN UR QL STRIP: ABNORMAL
WBC # BLD AUTO: 7.9 10*3/MM3 (ref 3.4–10.8)
WBC UR QL AUTO: ABNORMAL /HPF

## 2021-10-11 PROCEDURE — 85610 PROTHROMBIN TIME: CPT

## 2021-10-11 PROCEDURE — C9803 HOPD COVID-19 SPEC COLLECT: HCPCS

## 2021-10-11 PROCEDURE — 86901 BLOOD TYPING SEROLOGIC RH(D): CPT

## 2021-10-11 PROCEDURE — 71046 X-RAY EXAM CHEST 2 VIEWS: CPT

## 2021-10-11 PROCEDURE — 81001 URINALYSIS AUTO W/SCOPE: CPT

## 2021-10-11 PROCEDURE — 93010 ELECTROCARDIOGRAM REPORT: CPT | Performed by: INTERNAL MEDICINE

## 2021-10-11 PROCEDURE — 86900 BLOOD TYPING SEROLOGIC ABO: CPT

## 2021-10-11 PROCEDURE — 36415 COLL VENOUS BLD VENIPUNCTURE: CPT

## 2021-10-11 PROCEDURE — 85730 THROMBOPLASTIN TIME PARTIAL: CPT

## 2021-10-11 PROCEDURE — 80053 COMPREHEN METABOLIC PANEL: CPT

## 2021-10-11 PROCEDURE — 87086 URINE CULTURE/COLONY COUNT: CPT

## 2021-10-11 PROCEDURE — U0005 INFEC AGEN DETEC AMPLI PROBE: HCPCS

## 2021-10-11 PROCEDURE — 93005 ELECTROCARDIOGRAM TRACING: CPT

## 2021-10-11 PROCEDURE — 85027 COMPLETE CBC AUTOMATED: CPT

## 2021-10-11 PROCEDURE — U0004 COV-19 TEST NON-CDC HGH THRU: HCPCS

## 2021-10-11 PROCEDURE — 86850 RBC ANTIBODY SCREEN: CPT

## 2021-10-11 RX ORDER — HYDROCODONE BITARTRATE AND ACETAMINOPHEN 5; 325 MG/1; MG/1
1 TABLET ORAL 3 TIMES DAILY
COMMUNITY
End: 2021-10-14 | Stop reason: HOSPADM

## 2021-10-11 NOTE — DISCHARGE INSTRUCTIONS
Take the following medications the morning of surgery: AMLODIPINE AND HYDROCODONE AS NEEDED       If you are on prescription narcotic pain medication to control your pain you may also take that medication the morning of surgery.    General Instructions:  • Do not eat solid food after midnight the night before surgery.  • You may drink clear liquids day of surgery but must stop at least one hour before your hospital arrival time.  • It is beneficial for you to have a clear drink that contains carbohydrates the day of surgery.  We suggest a 12 to 20 ounce bottle of Gatorade or Powerade for non-diabetic patients or a 12 to 20 ounce bottle of G2 or Powerade Zero for diabetic patients.     Clear liquids are liquids you can see through.  Nothing red in color.     Plain water                               Sports drinks  Sodas                                   Gelatin (Jell-O)  Fruit juices without pulp such as white grape juice and apple juice  Popsicles that contain no fruit or yogurt  Tea or coffee (no cream or milk added)  Gatorade / Powerade  G2 / Powerade Zero    • Bring any papers given to you in the doctor’s office.  • Wear clean comfortable clothes.  • Do not wear contact lenses, false eyelashes or make-up.  Bring a case for your glasses.   • Bring crutches or walker if applicable.  • Remove all piercings.  Leave jewelry and any other valuables at home.  • The Pre-Admission Testing nurse will instruct you to bring medications if unable to obtain an accurate list in Pre-Admission Testing.        If you were given a blood bank ID arm band remember to bring it with you the day of surgery.    Preventing a Surgical Site Infection:  • For 2 to 3 days before surgery, avoid shaving with a razor because the razor can irritate skin and make it easier to develop an infection.    • Any areas of open skin can increase the risk of a post-operative wound infection by allowing bacteria to enter and travel throughout the body.   Notify your surgeon if you have any skin wounds / rashes even if it is not near the expected surgical site.  The area will need assessed to determine if surgery should be delayed until it is healed.  • The night prior to surgery shower using a fresh bar of anti-bacterial soap (such as Dial) and clean washcloth.  Sleep in a clean bed with clean clothing.  Do not allow pets to sleep with you.  • Shower on the morning of surgery using a fresh bar of anti-bacterial soap (such as Dial) and clean washcloth.  Dry with a clean towel and dress in clean clothing.  • Ask your surgeon if you will be receiving antibiotics prior to surgery.  • Make sure you, your family, and all healthcare providers clean their hands with soap and water or an alcohol based hand  before caring for you or your wound.    Day of surgery:  Your arrival time is approximately two hours before your scheduled surgery time.  Upon arrival, a Pre-op nurse and Anesthesiologist will review your health history, obtain vital signs, and answer questions you may have.  The only belongings needed at this time will be a list of your home medications and if applicable your C-PAP/BI-PAP machine.  A Pre-op nurse will start an IV and you may receive medication in preparation for surgery, including something to help you relax.     Please be aware that surgery does come with discomfort.  We want to make every effort to control your discomfort so please discuss any uncontrolled symptoms with your nurse.   Your doctor will most likely have prescribed pain medications.          If you are staying overnight following surgery, you will be transported to your hospital room following the recovery period.   has all private rooms.    If you have any questions please call Pre-Admission Testing at [phone].  Deductibles and co-payments are collected on the day of service. Please be prepared to pay the required co-pay, deductible or deposit on  the day of service as defined by your plan.    Patient Education for Self-Quarantine Process    • Following your COVID testing, we strongly recommend that you wear a mask when you are with other people and practice social distancing.   • Limit your activities to only required outings.  • Wash your hands with soap and water frequently for at least 20 seconds.   • Avoid touching your eyes, nose and mouth with unwashed hands.  • Do not share anything - utensils, drinking glasses, food from the same bowl.   • Sanitize household surfaces daily. Include all high touch areas (door handles, light switches, phones, countertops, etc.)    Call your surgeon immediately if you experience any of the following symptoms:  • Sore Throat  • Shortness of Breath or difficulty breathing  • Cough  • Chills  • Body soreness or muscle pain  • Headache  • Fever  • New loss of taste or smell  • Do not arrive for your surgery ill.  Your procedure will need to be rescheduled to another time.  You will need to call your physician before the day of surgery to avoid any unnecessary exposure to hospital staff as well as other patients.

## 2021-10-12 LAB — BACTERIA SPEC AEROBE CULT: NORMAL

## 2021-10-13 ENCOUNTER — APPOINTMENT (OUTPATIENT)
Dept: GENERAL RADIOLOGY | Facility: HOSPITAL | Age: 76
End: 2021-10-13

## 2021-10-13 ENCOUNTER — ANESTHESIA EVENT (OUTPATIENT)
Dept: PERIOP | Facility: HOSPITAL | Age: 76
End: 2021-10-13

## 2021-10-13 ENCOUNTER — ANESTHESIA (OUTPATIENT)
Dept: PERIOP | Facility: HOSPITAL | Age: 76
End: 2021-10-13

## 2021-10-13 ENCOUNTER — HOSPITAL ENCOUNTER (OUTPATIENT)
Facility: HOSPITAL | Age: 76
Discharge: HOME | End: 2021-10-14
Attending: ORTHOPAEDIC SURGERY | Admitting: ORTHOPAEDIC SURGERY

## 2021-10-13 DIAGNOSIS — M48.02 CERVICAL STENOSIS OF SPINAL CANAL: Primary | ICD-10-CM

## 2021-10-13 PROCEDURE — 25010000002 DEXAMETHASONE PER 1 MG: Performed by: NURSE ANESTHETIST, CERTIFIED REGISTERED

## 2021-10-13 PROCEDURE — 25010000002 MIDAZOLAM PER 1 MG: Performed by: ANESTHESIOLOGY

## 2021-10-13 PROCEDURE — C1713 ANCHOR/SCREW BN/BN,TIS/BN: HCPCS | Performed by: ORTHOPAEDIC SURGERY

## 2021-10-13 PROCEDURE — 76000 FLUOROSCOPY <1 HR PHYS/QHP: CPT

## 2021-10-13 PROCEDURE — 25010000002 SUCCINYLCHOLINE PER 20 MG: Performed by: NURSE ANESTHETIST, CERTIFIED REGISTERED

## 2021-10-13 PROCEDURE — A9270 NON-COVERED ITEM OR SERVICE: HCPCS | Performed by: ORTHOPAEDIC SURGERY

## 2021-10-13 PROCEDURE — 72040 X-RAY EXAM NECK SPINE 2-3 VW: CPT

## 2021-10-13 PROCEDURE — C1889 IMPLANT/INSERT DEVICE, NOC: HCPCS | Performed by: ORTHOPAEDIC SURGERY

## 2021-10-13 PROCEDURE — 63710000001 HYDROCODONE-ACETAMINOPHEN 10-325 MG TABLET: Performed by: ORTHOPAEDIC SURGERY

## 2021-10-13 PROCEDURE — 25010000002 PROPOFOL 10 MG/ML EMULSION: Performed by: NURSE ANESTHETIST, CERTIFIED REGISTERED

## 2021-10-13 PROCEDURE — 25010000002 HYDROMORPHONE PER 4 MG: Performed by: NURSE ANESTHETIST, CERTIFIED REGISTERED

## 2021-10-13 PROCEDURE — 25010000002 HYDROMORPHONE 1 MG/ML SOLUTION: Performed by: ORTHOPAEDIC SURGERY

## 2021-10-13 PROCEDURE — 25010000002 FENTANYL CITRATE (PF) 50 MCG/ML SOLUTION: Performed by: NURSE ANESTHETIST, CERTIFIED REGISTERED

## 2021-10-13 PROCEDURE — 25010000003 CEFAZOLIN IN DEXTROSE 2-4 GM/100ML-% SOLUTION: Performed by: ORTHOPAEDIC SURGERY

## 2021-10-13 PROCEDURE — 63710000001 HYDROCODONE-ACETAMINOPHEN 7.5-325 MG TABLET: Performed by: NURSE ANESTHETIST, CERTIFIED REGISTERED

## 2021-10-13 PROCEDURE — C1762 CONN TISS, HUMAN(INC FASCIA): HCPCS | Performed by: ORTHOPAEDIC SURGERY

## 2021-10-13 PROCEDURE — G0378 HOSPITAL OBSERVATION PER HR: HCPCS

## 2021-10-13 PROCEDURE — A9270 NON-COVERED ITEM OR SERVICE: HCPCS | Performed by: NURSE ANESTHETIST, CERTIFIED REGISTERED

## 2021-10-13 PROCEDURE — 25010000002 PHENYLEPHRINE 10 MG/ML SOLUTION: Performed by: NURSE ANESTHETIST, CERTIFIED REGISTERED

## 2021-10-13 DEVICE — DEV WND/CLS CONTRL TISS STRATAFIX SPIRAL MNCRYL SH 3/0 20CM: Type: IMPLANTABLE DEVICE | Site: SPINE CERVICAL | Status: FUNCTIONAL

## 2021-10-13 DEVICE — ALLOGRFT MORSELIZED CANC TRINITY ELITE LG: Type: IMPLANTABLE DEVICE | Site: SPINE CERVICAL | Status: FUNCTIONAL

## 2021-10-13 DEVICE — IMPLANTABLE DEVICE: Type: IMPLANTABLE DEVICE | Site: SPINE CERVICAL | Status: FUNCTIONAL

## 2021-10-13 DEVICE — FLOSEAL HEMOSTATIC MATRIX, 10ML
Type: IMPLANTABLE DEVICE | Site: SPINE CERVICAL | Status: FUNCTIONAL
Brand: FLOSEAL HEMOSTATIC MATRIX

## 2021-10-13 DEVICE — SCRW VAR ANT/CERV OZARK SLF/STRT 4X14MM: Type: IMPLANTABLE DEVICE | Site: SPINE CERVICAL | Status: FUNCTIONAL

## 2021-10-13 DEVICE — CAGE INTRBDY CERV CASCADIA 3D POR/TI 7DEG 13X16X7MM: Type: IMPLANTABLE DEVICE | Site: SPINE CERVICAL | Status: FUNCTIONAL

## 2021-10-13 RX ORDER — FLUMAZENIL 0.1 MG/ML
0.2 INJECTION INTRAVENOUS AS NEEDED
Status: DISCONTINUED | OUTPATIENT
Start: 2021-10-13 | End: 2021-10-13 | Stop reason: HOSPADM

## 2021-10-13 RX ORDER — PROPOFOL 10 MG/ML
VIAL (ML) INTRAVENOUS AS NEEDED
Status: DISCONTINUED | OUTPATIENT
Start: 2021-10-13 | End: 2021-10-13 | Stop reason: SURG

## 2021-10-13 RX ORDER — BISACODYL 5 MG/1
5 TABLET, DELAYED RELEASE ORAL DAILY PRN
Status: DISCONTINUED | OUTPATIENT
Start: 2021-10-13 | End: 2021-10-14 | Stop reason: HOSPADM

## 2021-10-13 RX ORDER — SODIUM CHLORIDE 0.9 % (FLUSH) 0.9 %
10 SYRINGE (ML) INJECTION AS NEEDED
Status: DISCONTINUED | OUTPATIENT
Start: 2021-10-13 | End: 2021-10-14 | Stop reason: HOSPADM

## 2021-10-13 RX ORDER — FAMOTIDINE 10 MG/ML
20 INJECTION, SOLUTION INTRAVENOUS ONCE
Status: COMPLETED | OUTPATIENT
Start: 2021-10-13 | End: 2021-10-13

## 2021-10-13 RX ORDER — MIDAZOLAM HYDROCHLORIDE 1 MG/ML
0.5 INJECTION INTRAMUSCULAR; INTRAVENOUS
Status: DISCONTINUED | OUTPATIENT
Start: 2021-10-13 | End: 2021-10-13 | Stop reason: HOSPADM

## 2021-10-13 RX ORDER — HYDRALAZINE HYDROCHLORIDE 20 MG/ML
5 INJECTION INTRAMUSCULAR; INTRAVENOUS
Status: DISCONTINUED | OUTPATIENT
Start: 2021-10-13 | End: 2021-10-13 | Stop reason: HOSPADM

## 2021-10-13 RX ORDER — HYDROCODONE BITARTRATE AND ACETAMINOPHEN 10; 325 MG/1; MG/1
1 TABLET ORAL EVERY 4 HOURS PRN
Status: DISCONTINUED | OUTPATIENT
Start: 2021-10-13 | End: 2021-10-14 | Stop reason: HOSPADM

## 2021-10-13 RX ORDER — CEFAZOLIN SODIUM 2 G/100ML
2 INJECTION, SOLUTION INTRAVENOUS EVERY 8 HOURS
Status: COMPLETED | OUTPATIENT
Start: 2021-10-13 | End: 2021-10-14

## 2021-10-13 RX ORDER — SODIUM CHLORIDE, SODIUM LACTATE, POTASSIUM CHLORIDE, CALCIUM CHLORIDE 600; 310; 30; 20 MG/100ML; MG/100ML; MG/100ML; MG/100ML
9 INJECTION, SOLUTION INTRAVENOUS CONTINUOUS
Status: DISCONTINUED | OUTPATIENT
Start: 2021-10-13 | End: 2021-10-14 | Stop reason: HOSPADM

## 2021-10-13 RX ORDER — MAGNESIUM HYDROXIDE 1200 MG/15ML
LIQUID ORAL AS NEEDED
Status: DISCONTINUED | OUTPATIENT
Start: 2021-10-13 | End: 2021-10-13 | Stop reason: HOSPADM

## 2021-10-13 RX ORDER — SUCCINYLCHOLINE CHLORIDE 20 MG/ML
INJECTION INTRAMUSCULAR; INTRAVENOUS AS NEEDED
Status: DISCONTINUED | OUTPATIENT
Start: 2021-10-13 | End: 2021-10-13 | Stop reason: SURG

## 2021-10-13 RX ORDER — ONDANSETRON 2 MG/ML
4 INJECTION INTRAMUSCULAR; INTRAVENOUS ONCE AS NEEDED
Status: DISCONTINUED | OUTPATIENT
Start: 2021-10-13 | End: 2021-10-13 | Stop reason: HOSPADM

## 2021-10-13 RX ORDER — LABETALOL HYDROCHLORIDE 5 MG/ML
5 INJECTION, SOLUTION INTRAVENOUS
Status: DISCONTINUED | OUTPATIENT
Start: 2021-10-13 | End: 2021-10-13 | Stop reason: HOSPADM

## 2021-10-13 RX ORDER — ONDANSETRON 2 MG/ML
4 INJECTION INTRAMUSCULAR; INTRAVENOUS EVERY 6 HOURS PRN
Status: DISCONTINUED | OUTPATIENT
Start: 2021-10-13 | End: 2021-10-14 | Stop reason: HOSPADM

## 2021-10-13 RX ORDER — SODIUM CHLORIDE 0.9 % (FLUSH) 0.9 %
3-10 SYRINGE (ML) INJECTION AS NEEDED
Status: DISCONTINUED | OUTPATIENT
Start: 2021-10-13 | End: 2021-10-13 | Stop reason: HOSPADM

## 2021-10-13 RX ORDER — LORAZEPAM 2 MG/ML
1 INJECTION INTRAMUSCULAR EVERY 6 HOURS PRN
Status: DISCONTINUED | OUTPATIENT
Start: 2021-10-13 | End: 2021-10-14 | Stop reason: HOSPADM

## 2021-10-13 RX ORDER — BISACODYL 10 MG
10 SUPPOSITORY, RECTAL RECTAL DAILY PRN
Status: DISCONTINUED | OUTPATIENT
Start: 2021-10-13 | End: 2021-10-14 | Stop reason: HOSPADM

## 2021-10-13 RX ORDER — HYDROCODONE BITARTRATE AND ACETAMINOPHEN 7.5; 325 MG/1; MG/1
1 TABLET ORAL ONCE AS NEEDED
Status: COMPLETED | OUTPATIENT
Start: 2021-10-13 | End: 2021-10-13

## 2021-10-13 RX ORDER — DIPHENHYDRAMINE HCL 25 MG
25 CAPSULE ORAL
Status: DISCONTINUED | OUTPATIENT
Start: 2021-10-13 | End: 2021-10-13 | Stop reason: HOSPADM

## 2021-10-13 RX ORDER — PHENYLEPHRINE HYDROCHLORIDE 10 MG/ML
INJECTION INTRAVENOUS AS NEEDED
Status: DISCONTINUED | OUTPATIENT
Start: 2021-10-13 | End: 2021-10-13 | Stop reason: SURG

## 2021-10-13 RX ORDER — DIPHENHYDRAMINE HYDROCHLORIDE 50 MG/ML
12.5 INJECTION INTRAMUSCULAR; INTRAVENOUS
Status: DISCONTINUED | OUTPATIENT
Start: 2021-10-13 | End: 2021-10-13 | Stop reason: HOSPADM

## 2021-10-13 RX ORDER — SODIUM CHLORIDE 0.9 % (FLUSH) 0.9 %
3 SYRINGE (ML) INJECTION EVERY 12 HOURS SCHEDULED
Status: DISCONTINUED | OUTPATIENT
Start: 2021-10-13 | End: 2021-10-13 | Stop reason: HOSPADM

## 2021-10-13 RX ORDER — FENTANYL CITRATE 50 UG/ML
50 INJECTION, SOLUTION INTRAMUSCULAR; INTRAVENOUS
Status: DISCONTINUED | OUTPATIENT
Start: 2021-10-13 | End: 2021-10-13 | Stop reason: HOSPADM

## 2021-10-13 RX ORDER — HYDROCODONE BITARTRATE AND ACETAMINOPHEN 5; 325 MG/1; MG/1
1 TABLET ORAL EVERY 4 HOURS PRN
Status: DISCONTINUED | OUTPATIENT
Start: 2021-10-13 | End: 2021-10-14 | Stop reason: HOSPADM

## 2021-10-13 RX ORDER — CEFAZOLIN SODIUM 2 G/100ML
2 INJECTION, SOLUTION INTRAVENOUS ONCE
Status: COMPLETED | OUTPATIENT
Start: 2021-10-13 | End: 2021-10-13

## 2021-10-13 RX ORDER — LIDOCAINE HYDROCHLORIDE 10 MG/ML
0.5 INJECTION, SOLUTION EPIDURAL; INFILTRATION; INTRACAUDAL; PERINEURAL ONCE AS NEEDED
Status: DISCONTINUED | OUTPATIENT
Start: 2021-10-13 | End: 2021-10-13 | Stop reason: HOSPADM

## 2021-10-13 RX ORDER — FENTANYL CITRATE 50 UG/ML
INJECTION, SOLUTION INTRAMUSCULAR; INTRAVENOUS AS NEEDED
Status: DISCONTINUED | OUTPATIENT
Start: 2021-10-13 | End: 2021-10-13 | Stop reason: SURG

## 2021-10-13 RX ORDER — LIDOCAINE HYDROCHLORIDE 20 MG/ML
INJECTION, SOLUTION INFILTRATION; PERINEURAL AS NEEDED
Status: DISCONTINUED | OUTPATIENT
Start: 2021-10-13 | End: 2021-10-13 | Stop reason: SURG

## 2021-10-13 RX ORDER — ROCURONIUM BROMIDE 10 MG/ML
INJECTION, SOLUTION INTRAVENOUS AS NEEDED
Status: DISCONTINUED | OUTPATIENT
Start: 2021-10-13 | End: 2021-10-13 | Stop reason: SURG

## 2021-10-13 RX ORDER — NALOXONE HCL 0.4 MG/ML
0.4 VIAL (ML) INJECTION
Status: DISCONTINUED | OUTPATIENT
Start: 2021-10-13 | End: 2021-10-14 | Stop reason: HOSPADM

## 2021-10-13 RX ORDER — HYDROMORPHONE HYDROCHLORIDE 1 MG/ML
0.5 INJECTION, SOLUTION INTRAMUSCULAR; INTRAVENOUS; SUBCUTANEOUS
Status: DISCONTINUED | OUTPATIENT
Start: 2021-10-13 | End: 2021-10-13 | Stop reason: HOSPADM

## 2021-10-13 RX ORDER — CHOLECALCIFEROL (VITAMIN D3) 125 MCG
5 CAPSULE ORAL NIGHTLY PRN
Status: DISCONTINUED | OUTPATIENT
Start: 2021-10-13 | End: 2021-10-14 | Stop reason: HOSPADM

## 2021-10-13 RX ORDER — EPHEDRINE SULFATE 50 MG/ML
5 INJECTION, SOLUTION INTRAVENOUS ONCE AS NEEDED
Status: DISCONTINUED | OUTPATIENT
Start: 2021-10-13 | End: 2021-10-13 | Stop reason: HOSPADM

## 2021-10-13 RX ORDER — PROMETHAZINE HYDROCHLORIDE 25 MG/1
25 TABLET ORAL ONCE AS NEEDED
Status: DISCONTINUED | OUTPATIENT
Start: 2021-10-13 | End: 2021-10-13 | Stop reason: HOSPADM

## 2021-10-13 RX ORDER — POLYETHYLENE GLYCOL 3350 17 G/17G
17 POWDER, FOR SOLUTION ORAL DAILY PRN
Status: DISCONTINUED | OUTPATIENT
Start: 2021-10-13 | End: 2021-10-14 | Stop reason: HOSPADM

## 2021-10-13 RX ORDER — NALOXONE HCL 0.4 MG/ML
0.2 VIAL (ML) INJECTION AS NEEDED
Status: DISCONTINUED | OUTPATIENT
Start: 2021-10-13 | End: 2021-10-13 | Stop reason: HOSPADM

## 2021-10-13 RX ORDER — DOCUSATE SODIUM 100 MG/1
100 CAPSULE, LIQUID FILLED ORAL 2 TIMES DAILY PRN
Status: DISCONTINUED | OUTPATIENT
Start: 2021-10-13 | End: 2021-10-14 | Stop reason: HOSPADM

## 2021-10-13 RX ORDER — SODIUM CHLORIDE, SODIUM LACTATE, POTASSIUM CHLORIDE, CALCIUM CHLORIDE 600; 310; 30; 20 MG/100ML; MG/100ML; MG/100ML; MG/100ML
INJECTION, SOLUTION INTRAVENOUS CONTINUOUS PRN
Status: DISCONTINUED | OUTPATIENT
Start: 2021-10-13 | End: 2021-10-13 | Stop reason: SURG

## 2021-10-13 RX ORDER — OXYCODONE AND ACETAMINOPHEN 10; 325 MG/1; MG/1
1 TABLET ORAL EVERY 4 HOURS PRN
Status: DISCONTINUED | OUTPATIENT
Start: 2021-10-13 | End: 2021-10-13 | Stop reason: HOSPADM

## 2021-10-13 RX ORDER — SODIUM CHLORIDE 0.9 % (FLUSH) 0.9 %
3 SYRINGE (ML) INJECTION EVERY 12 HOURS SCHEDULED
Status: DISCONTINUED | OUTPATIENT
Start: 2021-10-13 | End: 2021-10-14 | Stop reason: HOSPADM

## 2021-10-13 RX ORDER — PROMETHAZINE HYDROCHLORIDE 25 MG/1
25 SUPPOSITORY RECTAL ONCE AS NEEDED
Status: DISCONTINUED | OUTPATIENT
Start: 2021-10-13 | End: 2021-10-13 | Stop reason: HOSPADM

## 2021-10-13 RX ORDER — AMOXICILLIN 250 MG
1 CAPSULE ORAL NIGHTLY PRN
Status: DISCONTINUED | OUTPATIENT
Start: 2021-10-13 | End: 2021-10-14 | Stop reason: HOSPADM

## 2021-10-13 RX ORDER — DEXAMETHASONE SODIUM PHOSPHATE 10 MG/ML
INJECTION INTRAMUSCULAR; INTRAVENOUS AS NEEDED
Status: DISCONTINUED | OUTPATIENT
Start: 2021-10-13 | End: 2021-10-13 | Stop reason: SURG

## 2021-10-13 RX ORDER — CARISOPRODOL 350 MG/1
350 TABLET ORAL 4 TIMES DAILY PRN
Status: DISCONTINUED | OUTPATIENT
Start: 2021-10-13 | End: 2021-10-14 | Stop reason: HOSPADM

## 2021-10-13 RX ORDER — ACETAMINOPHEN 325 MG/1
650 TABLET ORAL ONCE AS NEEDED
Status: DISCONTINUED | OUTPATIENT
Start: 2021-10-13 | End: 2021-10-13 | Stop reason: HOSPADM

## 2021-10-13 RX ADMIN — ROCURONIUM BROMIDE 30 MG: 50 INJECTION INTRAVENOUS at 15:06

## 2021-10-13 RX ADMIN — FENTANYL CITRATE 50 MCG: 50 INJECTION INTRAMUSCULAR; INTRAVENOUS at 17:37

## 2021-10-13 RX ADMIN — PROPOFOL 150 MG: 10 INJECTION, EMULSION INTRAVENOUS at 13:09

## 2021-10-13 RX ADMIN — MIDAZOLAM 0.5 MG: 1 INJECTION INTRAMUSCULAR; INTRAVENOUS at 12:41

## 2021-10-13 RX ADMIN — HYDROMORPHONE HYDROCHLORIDE 0.5 MG: 1 INJECTION, SOLUTION INTRAMUSCULAR; INTRAVENOUS; SUBCUTANEOUS at 19:12

## 2021-10-13 RX ADMIN — PHENYLEPHRINE HYDROCHLORIDE 200 MCG: 10 INJECTION, SOLUTION INTRAVENOUS at 14:50

## 2021-10-13 RX ADMIN — FAMOTIDINE 20 MG: 10 INJECTION, SOLUTION INTRAVENOUS at 11:34

## 2021-10-13 RX ADMIN — PHENYLEPHRINE HYDROCHLORIDE 100 MCG: 10 INJECTION, SOLUTION INTRAVENOUS at 14:22

## 2021-10-13 RX ADMIN — PHENYLEPHRINE HYDROCHLORIDE 100 MCG: 10 INJECTION, SOLUTION INTRAVENOUS at 13:38

## 2021-10-13 RX ADMIN — SODIUM CHLORIDE, POTASSIUM CHLORIDE, SODIUM LACTATE AND CALCIUM CHLORIDE 9 ML/HR: 600; 310; 30; 20 INJECTION, SOLUTION INTRAVENOUS at 23:06

## 2021-10-13 RX ADMIN — SUGAMMADEX 200 MG: 100 INJECTION, SOLUTION INTRAVENOUS at 16:27

## 2021-10-13 RX ADMIN — FENTANYL CITRATE 50 MCG: 0.05 INJECTION, SOLUTION INTRAMUSCULAR; INTRAVENOUS at 13:07

## 2021-10-13 RX ADMIN — CEFAZOLIN SODIUM 2 G: 2 INJECTION, SOLUTION INTRAVENOUS at 20:49

## 2021-10-13 RX ADMIN — PHENYLEPHRINE HYDROCHLORIDE 100 MCG: 10 INJECTION, SOLUTION INTRAVENOUS at 15:07

## 2021-10-13 RX ADMIN — HYDROMORPHONE HYDROCHLORIDE 0.5 MG: 1 INJECTION, SOLUTION INTRAMUSCULAR; INTRAVENOUS; SUBCUTANEOUS at 17:55

## 2021-10-13 RX ADMIN — PHENYLEPHRINE HYDROCHLORIDE 100 MCG: 10 INJECTION, SOLUTION INTRAVENOUS at 14:32

## 2021-10-13 RX ADMIN — SODIUM CHLORIDE, POTASSIUM CHLORIDE, SODIUM LACTATE AND CALCIUM CHLORIDE 9 ML/HR: 600; 310; 30; 20 INJECTION, SOLUTION INTRAVENOUS at 11:34

## 2021-10-13 RX ADMIN — FENTANYL CITRATE 50 MCG: 0.05 INJECTION, SOLUTION INTRAMUSCULAR; INTRAVENOUS at 13:25

## 2021-10-13 RX ADMIN — PHENYLEPHRINE HYDROCHLORIDE 100 MCG: 10 INJECTION, SOLUTION INTRAVENOUS at 15:51

## 2021-10-13 RX ADMIN — HYDROCODONE BITARTRATE AND ACETAMINOPHEN 1 TABLET: 7.5; 325 TABLET ORAL at 18:18

## 2021-10-13 RX ADMIN — SODIUM CHLORIDE, PRESERVATIVE FREE 3 ML: 5 INJECTION INTRAVENOUS at 20:51

## 2021-10-13 RX ADMIN — DEXAMETHASONE SODIUM PHOSPHATE 10 MG: 10 INJECTION INTRAMUSCULAR; INTRAVENOUS at 13:39

## 2021-10-13 RX ADMIN — HYDROMORPHONE HYDROCHLORIDE 1 MG: 1 INJECTION, SOLUTION INTRAMUSCULAR; INTRAVENOUS; SUBCUTANEOUS at 23:36

## 2021-10-13 RX ADMIN — SUCCINYLCHOLINE CHLORIDE 140 MG: 20 INJECTION, SOLUTION INTRAMUSCULAR; INTRAVENOUS; PARENTERAL at 13:09

## 2021-10-13 RX ADMIN — SODIUM CHLORIDE, POTASSIUM CHLORIDE, SODIUM LACTATE AND CALCIUM CHLORIDE: 600; 310; 30; 20 INJECTION, SOLUTION INTRAVENOUS at 16:16

## 2021-10-13 RX ADMIN — FENTANYL CITRATE 50 MCG: 0.05 INJECTION, SOLUTION INTRAMUSCULAR; INTRAVENOUS at 15:06

## 2021-10-13 RX ADMIN — PHENYLEPHRINE HYDROCHLORIDE 100 MCG: 10 INJECTION, SOLUTION INTRAVENOUS at 15:11

## 2021-10-13 RX ADMIN — SODIUM CHLORIDE, POTASSIUM CHLORIDE, SODIUM LACTATE AND CALCIUM CHLORIDE: 600; 310; 30; 20 INJECTION, SOLUTION INTRAVENOUS at 13:14

## 2021-10-13 RX ADMIN — FENTANYL CITRATE 50 MCG: 0.05 INJECTION, SOLUTION INTRAMUSCULAR; INTRAVENOUS at 16:13

## 2021-10-13 RX ADMIN — PHENYLEPHRINE HYDROCHLORIDE 200 MCG: 10 INJECTION, SOLUTION INTRAVENOUS at 14:41

## 2021-10-13 RX ADMIN — HYDROCODONE BITARTRATE AND ACETAMINOPHEN 1 TABLET: 10; 325 TABLET ORAL at 20:49

## 2021-10-13 RX ADMIN — LIDOCAINE HYDROCHLORIDE 100 MG: 20 INJECTION, SOLUTION INFILTRATION; PERINEURAL at 13:09

## 2021-10-13 RX ADMIN — PROPOFOL 150 MCG/KG/MIN: 10 INJECTION, EMULSION INTRAVENOUS at 13:15

## 2021-10-13 RX ADMIN — PHENYLEPHRINE HYDROCHLORIDE 100 MCG: 10 INJECTION, SOLUTION INTRAVENOUS at 13:51

## 2021-10-13 RX ADMIN — HYDROMORPHONE HYDROCHLORIDE 0.5 MG: 1 INJECTION, SOLUTION INTRAMUSCULAR; INTRAVENOUS; SUBCUTANEOUS at 18:15

## 2021-10-13 RX ADMIN — PROPOFOL 100 MCG/KG/MIN: 10 INJECTION, EMULSION INTRAVENOUS at 14:23

## 2021-10-13 RX ADMIN — CEFAZOLIN SODIUM 2 G: 2 INJECTION, SOLUTION INTRAVENOUS at 12:56

## 2021-10-13 RX ADMIN — ROCURONIUM BROMIDE 20 MG: 50 INJECTION INTRAVENOUS at 13:34

## 2021-10-13 NOTE — PERIOPERATIVE NURSING NOTE
Clarified with MD the cervical levels for surgery, as they were not written out on consent order. New consent reads: anterior cervical discectomy and fusion C4 to C5, C5 to C6, C6 to C7 with allograft bone. MD confirmed, pt confirmed.

## 2021-10-13 NOTE — ANESTHESIA POSTPROCEDURE EVALUATION
"Patient: Leandra Wesley    Procedure Summary     Date: 10/13/21 Room / Location: [facility] OR  / [facility] MAIN OR    Anesthesia Start: 1302 Anesthesia Stop: 1729    Procedure: ANTERIOR CERVICAL DISCECTOMY AND FUSION C4 -C7 WITH APPLICATION OF ALLOGRAFT BONE (N/A Spine Cervical) Diagnosis:     Surgeons: Timmy Gaytan MD Provider: Danielle Hernandez MD    Anesthesia Type: general ASA Status: 3          Anesthesia Type: general    Vitals  Vitals Value Taken Time   /63 10/13/21 1911   Temp 36.6 °C (97.9 °F) 10/13/21 1730   Pulse 79 10/13/21 1924   Resp 16 10/13/21 1900   SpO2 100 % 10/13/21 1925   Vitals shown include unvalidated device data.        Post Anesthesia Care and Evaluation    Patient location during evaluation: PACU  Patient participation: complete - patient cannot participate  Pain management: adequate  Airway patency: patent  Anesthetic complications: No anesthetic complications    Cardiovascular status: acceptable  Respiratory status: acceptable  Hydration status: acceptable    Comments: /70   Pulse 87   Temp 36.6 °C (97.9 °F) (Oral)   Resp 16   Ht 160 cm (63\")   Wt 82.1 kg (181 lb 1.6 oz)   SpO2 99%   BMI 32.08 kg/m²     Patient discharged before being seen by an Anesthesiologist.  No anesthetic complications noted per record.      "

## 2021-10-13 NOTE — PROGRESS NOTES
"Ortho Spine  S/p C4-7 ACDF    Patient resting comfortably in PACU.       /61 (BP Location: Right arm, Patient Position: Lying)   Pulse 77   Temp 97.8 °F (36.6 °C) (Oral)   Resp 18   Ht 160 cm (63\")   Wt 82.1 kg (181 lb 1.6 oz)   SpO2 99% Comment: post sedation  BMI 32.08 kg/m²   BUE - Neuro intact.      LLE - Baseline left foot drop    A/P  Tx to floor  Admit to observation  SCDs for DVT ppx  Hard cervical collar  PT eval and treat  24 hrs IV abx    Timmy Gaytan MD    "

## 2021-10-13 NOTE — ANESTHESIA PROCEDURE NOTES
Peripheral IV    Patient location during procedure: OR  Start time: 10/13/2021 1:15 PM  Line placed for Fluids/Medication Admin.  Performed By   Anesthesiologist: Trenton Lucas MD  Preanesthetic Checklist  Completed: patient identified, IV checked, site marked, risks and benefits discussed, surgical consent, monitors and equipment checked and pre-op evaluation  Peripheral IV Prep   Patient position: supine   Prep: ChloraPrep  Patient monitoring: heart rate and continuous pulse ox  Peripheral IV Procedure   Laterality:left  Location:  Antecubital  Catheter size: 18 G         Post Assessment   Dressing Type: tape and transparent.    IV Dressing/Site: clean, dry and intact

## 2021-10-13 NOTE — ANESTHESIA PROCEDURE NOTES
Airway  Urgency: elective    Date/Time: 10/13/2021 1:12 PM    General Information and Staff    Patient location during procedure: OR  Anesthesiologist: Trenton Lucas MD  CRNA: Dinorah Ruiz CRNA    Indications and Patient Condition  Indications for airway management: airway protection    Preoxygenated: yes  Mask difficulty assessment: 1 - vent by mask    Final Airway Details  Final airway type: endotracheal airway      Successful airway: ETT and NIM tube  Cuffed: yes   Successful intubation technique: video laryngoscopy  Facilitating devices/methods: intubating stylet  Endotracheal tube insertion site: oral  Blade: CMAC  Blade size: D  ETT size (mm): 7.0  Cormack-Lehane Classification: grade IIa - partial view of glottis  Placement verified by: chest auscultation and capnometry   Measured from: lips  Number of attempts at approach: 1  Assessment: lips, teeth, and gum same as pre-op and atraumatic intubation    Additional Comments  L front incisor noted to be chipped prior to induction. No change to dentition. CMAC utilized to ensure neck stayed neutral throughout induction and for NIMS tube verification

## 2021-10-13 NOTE — ANESTHESIA PREPROCEDURE EVALUATION
Anesthesia Evaluation     Patient summary reviewed and Nursing notes reviewed   NPO Solid Status: > 8 hours  NPO Liquid Status: > 2 hours           Airway   Mallampati: II  TM distance: >3 FB  Neck ROM: full  Dental - normal exam     Pulmonary - negative pulmonary ROS and normal exam   Cardiovascular - normal exam    ECG reviewed    (+) hypertension,  carotid artery disease carotid bilateral      Neuro/Psych  (+) CVA, numbness,     GI/Hepatic/Renal/Endo    (+)  GERD,      Musculoskeletal     Abdominal    Substance History - negative use     OB/GYN negative ob/gyn ROS         Other   arthritis,                      Anesthesia Plan    ASA 3     general       Anesthetic plan, all risks, benefits, and alternatives have been provided, discussed and informed consent has been obtained with: patient.

## 2021-10-13 NOTE — OP NOTE
CERVICAL DISCECTOMY ANTERIOR FUSION WITH INSTRUMENTATION  Procedure Report    Patient Name:  Leandra Wesley  YOB: 1945    Date of Surgery:  10/13/2021     Indications: This is a 75-year-old female who presented my office with balance and difficulty walking.  She had weakness to left foot dorsiflexion and was using an AFO.  Imaging work-up including MRI of the cervical, thoracic and lumbar spine showed mild degenerative findings in the lumbar and thoracic spine but significant stenosis spanning C4-C7 in the cervical spine with cord compression.  We discussed treatment options and alternatives with her.  Patient had tried conservative therapy.  We did discuss with her the natural history of cervical myelopathy and expectations.  We discussed best available evidence.  We did discussed with her that the goals of this procedure would be to keep her from worsening functionally and neurologically.  She wanted to proceed with surgical intervention.  After careful review of her imaging studies including x-ray and MRI an anterior approach was discussed and offered.  We discussed spanning the levels of stenosis from C4-C7 with anterior cervical discectomy and fusion.  We discussed the risks of this procedure which include but not limited to risk of anesthesia including heart attack stroke and even death as well as risk of the procedure including neurologic injury, bleeding, infection, paralysis, epidural hematoma, blood clot formation, spinal fluid leak, continued pain, pseudoarthrosis, and potential need for reoperation.  Patient understood these risks and agreed to proceed.  Informed consent was signed.    Pre-op Diagnosis:   Cervical stenosis  Cervical myelopathy       Post-Op Diagnosis Codes:  As above    Procedure/CPT® Codes:  20155  Anterior cervical discectomy and fusion  29751 59 modifier for 3 interspaces  84375 59 modifier 3 level application of interbody device  54703  Instrumentation 2-3 vertebral  segments  20930 Applicaton of allograft bone  20936 Applicaton of autograft bone    Procedure(s):  ANTERIOR CERVICAL DISCECTOMY AND FUSION C4 -C7 WITH APPLICATION OF ALLOGRAFT BONE    Staff:  Surgeon(s):  Timmy Gaytan MD    Assistant: Kathleen Mota PA-C    Anesthesia: General    Estimated Blood Loss: 50cc    Implants:    Implant Name Type Inv. Item Serial No.  Lot No. LRB No. Used Action   DEV WND/CLS CONTRL TISS STRATAFIX SPIRAL MNCRYL SH 3/0 20CM - [id number] Implant DEV WND/CLS CONTRL TISS STRATAFIX SPIRAL MNCRYL SH 3/0 20CM  [employer]  DIV [employer] J [employer] J [id number] N/A 1 Implanted   KT SEAL HEMOS ABS FLOSEAL MATRX FAST/PREP 10ML - [id number] Implant KT SEAL HEMOS ABS FLOSEAL MATRX FAST/PREP 10ML  [facility] [id number] N/A 1 Implanted   ALLOGRFT MORSELIZED CANC YANE ELITE LG - [id number] Implant ALLOGRFT MORSELIZED CANC YANE ELITE LG  ORTHOFIX N/A N/A 1 Implanted   CAGE INTRBDY CERV CASCADIA 3D POR/TI 7DEG [id number] - [id number] Implant CAGE INTRBDY CERV CASCADIA 3D POR/TI 7DEG [id number]  K2M [id number] N/A 1 Implanted   CASCADIA CERVICAL INTERBODY SYSTEM Implant   K2M [id number] N/A 1 Implanted   CASCADIA INTERBODY SYSTEM    K2M [id number] N/A 1 Implanted   PLT CERV OZARK 3LVL 60MM - [id number] Implant PLT CERV OZARK 3LVL 60MM  K2M N/A N/A 1 Implanted   SCRW ALFREDO ANT/CERV OZARK SLF/STRT 4X14MM - [id number] Implant SCRW ALFREDO ANT/CERV OZARK SLF/STRT 4X14MM  K2M N/A N/A 8 Implanted       Specimen:          None        Findings: Cervical stenosis    Complications: None    Description of Procedure: Patient was identified the preoperative holding area.  The surgical site was marked.  Patient was brought back to the operative suite by anesthesia.  General endotracheal anesthesia was induced without difficulty.  Neuro monitoring was used throughout the case including MEP's, SSEPs and EMGs.  Signals were stable throughout.  The patient was placed supine on the operative table.  The shoulders were  taped as to aid in visualization.  All bony prominences were well-padded.  Sequential compression devices were used for DVT prophylaxis throughout the case.  A timeout was performed identifying patient characteristics.  Preoperative antibiotics were given.  An initial x-ray was taken with C arm fluoroscopy verifying alignment in the incision site.  Patient was then prepped and draped in usual sterile manner.  Preincision motors were stable with noted weakness to left ankle dorsiflexion that was baseline.  An incision was made left of the midline in accordance with our localizing C arm x-ray.  This was done using a 10 blade.  Bovie electrocautery was used to dissect the subcu layer.  The platysma layer was incised.  Sharp dissection was used to remove the fascial layers retracting the midline structures medial.  The anterior osteophytes were then palpated.  The prevertebral fascial layer was incised longitudinally.  We verified the appropriate level under C arm fluoroscopy.  We then continued our exposure and dissection undermined the longus coli bilaterally extending the exposure from C4-C7.  Distraction pins were placed in the C4 and C6 vertebral bodies.  We started at C5-6.  A self-retaining retractor was then placed.  The outer annulus was incised using a 15 blade.  The operative microscope was brought into the field.  We used a series of Kerrison rongeurs, cervical curettes and pituitary rongeurs to remove disc material working her way to the back of the vertebral body.  Significant osteophytes were encountered and removed with Kerrison rongeurs.  High-speed 3 mm matchstick bur was also used to perform the discectomy and decompression.  Upon completion of the decompression the canal was decompressed both centrally and in the foramen bilaterally.  Hemostasis was achieved using FloSeal.  We then trialed up to a size 7 interbody cage.  We held off placing the cage initially due to the ability to maintain  visualization of the remaining disc levels.  We then turned our attention to C4-5.  In a similar manner as above the annulus was incised using a 15 blade.  Kerrison rongeurs, cervical curettes and pituitary rongeurs were used to perform the discectomy working her way to the back of the vertebral bodies.  Kerrison rongeur was used to remove posterior osteophytes.  Upon completion of the decompression both foramen and the central canal were well decompressed and free of compression.  Hemostasis was achieved using FloSeal.  We trialed a size 5 mm interbody cage.  A K2 M [place] interbody cage that was filled with cellular morselized allograft bone matrix as well as local autograft bone preserved from the decompression was inserted.  It was felt that be stable fit.  We then removed our distraction screws.  We then placed the distraction screws in the C7 and C6 vertebral bodies exposing the C6-7 intervertebral space.  As above the discectomy was performed in similar manner by incising the outer annulus with a 15 blade.  Combination of high-speed bur, Kerrison rongeurs, cervical curettes and pituitary rongeurs were used to remove disc material and osteophytes working her way to the back of the vertebral bodies.  Posterior osteophytes were then removed.  Upon completion of the decompression the canal was decompressed centrally as well as in the foramen bilateral.  Hemostasis was achieved using FloSeal.  We trialed up to a size 6 mm interbody cage.  We inserted a K2 M [place] size 6 interbody cage that was filled with cellular morselized allograft bone matrix as well as local autograft bone preserved from the decompression.  Upon insertion and was felt to be of good fit and fill into the disc space.  We then placed distraction screws spanning C5-6 thus distracting the disc space once more.  We irrigated with copious amounts normal saline.  Hemostasis was achieved using FloSeal.  We then inserted a K2 M size 7 interbody  cage [place].  This was filled with cellular morselized allograft bone matrix as well as local autograft bone preserved from the decompression.  The cage was inserted with good fit and fill.  We verified the cages were appropriately placed under lateral C arm fluoroscopy.  We then selected a size 60 mm [place] anterior cervical plate.  The plate was bent to accommodate the patient's lordosis.  The plate was placed anterior to the cervical spine.  A size 14 mm locking screws were placed at each level of the plate including C4, C5, C6 and C7.  High-speed drill was used for  hole and the screws were inserted through the plate and a good bite was felt.  The screws were then locked through the plate using the locking mechanism.  The wound was then irrigated with copious amounts of normal saline.  Hemostasis was achieved using bipolar electrocautery.  The wound was inspected carefully for any bleeding.  A 10 Amharic Hemovac drain was then inserted.  4 mg of dexamethasone was used and placed anterior the plate to avoid postop dysphagia.  The wound was then closed in a layered fashion.  A final motor was run and showed stable signals compared to baseline.  A sterile dressing was then applied.  Patient was placed in a hard cervical collar.  She was then placed on the hospital bed.  She was woken from anesthesia and brought back to recovery in stable condition having tolerated the procedure well.    Patient will be admitted for observation.  She will receive 24 hours IV antibiotics.  We will plan on discharge possibly postop day 1.  She will follow-up in about 3 weeks for x-ray.  She will wear the hard cervical collar at most times.     Assistant: Kathleen Mota PA-C  was responsible for performing the following activities: Retraction, Suction, Irrigation, Suturing, Closing and Placing Dressing and their skilled assistance was necessary for the success of this case.    Timmy Gaytan MD     Date: 10/13/2021  Time:  17:02 EDT

## 2021-10-13 NOTE — H&P
"    Patient Care Team:  Soila Young MD as PCP - General  Soila Young MD as PCP - Family Medicine    Chief complaint balance changes. Left foot drop    Subjective     Patient is a 75 y.o. female presents with the above complaint.  She was seen and evaluated in my office.  Complete spinal MRI shows stenosis high grade in the cervical spine C4-7.  She is here today for surgical intervention after conservative measures have failed.       Review of Systems   Pertinent items are noted in HPI    History  Past Medical History:   Diagnosis Date   • Carotid stenosis     STATES 60 PERCENT BOTH SIDES. FOLLOWED YEARLY DR OCASIO   • Cervical disc disorder    • Chronic back pain    • DDD (degenerative disc disease), cervical    • Diverticular disease    • Foot drop, left foot     WEARS BRACE LEFT LEG   • GERD (gastroesophageal reflux disease)    • History of anemia    • History of kidney stones    • History of migraine    • Hypertension    • IBS (irritable bowel syndrome)    • Intestine disorder     intestines twisted, did not require surgical intervention, \"it straightened itself out\"   • Acevedo's neuroma of right foot     had surgery   • Seasonal allergies    • Stroke (HCC)     \"i re walk to my right\" ; left sided weakness     Past Surgical History:   Procedure Laterality Date   • BLADDER SURGERY     • CATARACT EXTRACTION W/ INTRAOCULAR LENS IMPLANT      LEFT AND RIGHT   • CHOLECYSTECTOMY     • COLONOSCOPY     • FOOT NEUROMA SURGERY Right     4TH TOE   • HYSTERECTOMY     • TONSILLECTOMY       Family History   Problem Relation Age of Onset   • Malig Hyperthermia Neg Hx      Social History     Tobacco Use   • Smoking status: Former Smoker   • Smokeless tobacco: Never Used   • Tobacco comment: smoked as teen   Vaping Use   • Vaping Use: Never used   Substance Use Topics   • Alcohol use: No   • Drug use: No     E-cigarette/Vaping   • E-cigarette/Vaping Use Never User      E-cigarette/Vaping Substances     Medications Prior to " Admission   Medication Sig Dispense Refill Last Dose   • amLODIPine (NORVASC) 10 MG tablet Take 10 mg by mouth Daily.   10/13/2021 at 0830   • cetirizine-pseudoephedrine (ZyrTEC-D) 5-120 MG per 12 hr tablet Take 1 tablet by mouth 2 (Two) Times a Day.   10/12/2021 at 2200   • Esomeprazole Magnesium (NEXIUM PO) Take 20 mg by mouth Every Evening.   10/12/2021 at 2200   • eszopiclone (LUNESTA) 3 MG tablet Take 3 mg by mouth Every Night. Take immediately before bedtime   10/12/2021 at 2200   • HYDROcodone-acetaminophen (NORCO) 5-325 MG per tablet Take 1 tablet by mouth 3 (Three) Times a Day.   10/12/2021 at 1800   • losartan-hydrochlorothiazide (HYZAAR) 100-25 MG per tablet Take 1 tablet by mouth Daily.   10/13/2021 at 0830   • aspirin 81 MG EC tablet Take 81 mg by mouth Daily. WILL HOLD FOR SURGERY   10/11/2021   • Melatonin 3 MG tablet Take 3 mg by mouth Every Night.   10/9/2021     Allergies:  Eggs or egg-derived products, Iodine, Penicillins, and Sulfa antibiotics    Objective     Vital Signs  Temp:  [97.8 °F (36.6 °C)] 97.8 °F (36.6 °C)  Heart Rate:  [77-79] 77  Resp:  [18-20] 18  BP: (122)/(61) 122/61    Physical Exam:    Neuro status at baseline with left foot drop. breathing non labored. Abdomen non distended.     Results Review:    I reviewed the patient's new clinical results.    Assessment/Plan       Cervical stenosis of spinal canal      Plan for C4-7 ACDF    I discussed the patients findings and my recommendations with patient and family.     Timmy Gaytan MD  10/13/21  12:47 EDT

## 2021-10-14 VITALS
TEMPERATURE: 97.5 F | OXYGEN SATURATION: 100 % | DIASTOLIC BLOOD PRESSURE: 74 MMHG | HEIGHT: 63 IN | SYSTOLIC BLOOD PRESSURE: 132 MMHG | BODY MASS INDEX: 32.09 KG/M2 | HEART RATE: 69 BPM | RESPIRATION RATE: 16 BRPM | WEIGHT: 181.1 LBS

## 2021-10-14 PROBLEM — Z86.73 HISTORY OF CVA (CEREBROVASCULAR ACCIDENT): Status: ACTIVE | Noted: 2021-10-14

## 2021-10-14 PROCEDURE — G0378 HOSPITAL OBSERVATION PER HR: HCPCS

## 2021-10-14 PROCEDURE — 63710000001 HYDROCODONE-ACETAMINOPHEN 10-325 MG TABLET: Performed by: ORTHOPAEDIC SURGERY

## 2021-10-14 PROCEDURE — 25010000003 CEFAZOLIN IN DEXTROSE 2-4 GM/100ML-% SOLUTION: Performed by: ORTHOPAEDIC SURGERY

## 2021-10-14 PROCEDURE — 97162 PT EVAL MOD COMPLEX 30 MIN: CPT

## 2021-10-14 PROCEDURE — 25010000002 ONDANSETRON PER 1 MG: Performed by: ORTHOPAEDIC SURGERY

## 2021-10-14 PROCEDURE — A9270 NON-COVERED ITEM OR SERVICE: HCPCS | Performed by: ORTHOPAEDIC SURGERY

## 2021-10-14 PROCEDURE — 97110 THERAPEUTIC EXERCISES: CPT

## 2021-10-14 RX ORDER — DOCUSATE SODIUM 100 MG/1
100 CAPSULE, LIQUID FILLED ORAL 2 TIMES DAILY PRN
Qty: 30 CAPSULE | Refills: 0 | Status: SHIPPED | OUTPATIENT
Start: 2021-10-14

## 2021-10-14 RX ORDER — HYDROCODONE BITARTRATE AND ACETAMINOPHEN 5; 325 MG/1; MG/1
1 TABLET ORAL EVERY 4 HOURS PRN
Qty: 56 TABLET | Refills: 0 | Status: CANCELLED | OUTPATIENT
Start: 2021-10-14 | End: 2021-10-20

## 2021-10-14 RX ORDER — BISACODYL 5 MG/1
5 TABLET, DELAYED RELEASE ORAL DAILY PRN
Qty: 30 TABLET | Refills: 0 | Status: SHIPPED | OUTPATIENT
Start: 2021-10-14

## 2021-10-14 RX ORDER — HYDROCODONE BITARTRATE AND ACETAMINOPHEN 10; 325 MG/1; MG/1
1 TABLET ORAL EVERY 4 HOURS PRN
Qty: 56 TABLET | Refills: 0 | Status: SHIPPED | OUTPATIENT
Start: 2021-10-14 | End: 2021-10-23

## 2021-10-14 RX ORDER — CARISOPRODOL 350 MG/1
350 TABLET ORAL 4 TIMES DAILY PRN
Qty: 30 TABLET | Refills: 0 | Status: SHIPPED | OUTPATIENT
Start: 2021-10-14 | End: 2021-10-22

## 2021-10-14 RX ORDER — AMOXICILLIN 250 MG
1 CAPSULE ORAL NIGHTLY PRN
Qty: 30 TABLET | Refills: 0 | Status: SHIPPED | OUTPATIENT
Start: 2021-10-14

## 2021-10-14 RX ADMIN — ONDANSETRON 4 MG: 2 INJECTION INTRAMUSCULAR; INTRAVENOUS at 07:06

## 2021-10-14 RX ADMIN — HYDROCODONE BITARTRATE AND ACETAMINOPHEN 1 TABLET: 10; 325 TABLET ORAL at 11:01

## 2021-10-14 RX ADMIN — CEFAZOLIN SODIUM 2 G: 2 INJECTION, SOLUTION INTRAVENOUS at 04:00

## 2021-10-14 RX ADMIN — SODIUM CHLORIDE, PRESERVATIVE FREE 3 ML: 5 INJECTION INTRAVENOUS at 08:01

## 2021-10-14 RX ADMIN — HYDROCODONE BITARTRATE AND ACETAMINOPHEN 1 TABLET: 10; 325 TABLET ORAL at 07:06

## 2021-10-14 RX ADMIN — HYDROCODONE BITARTRATE AND ACETAMINOPHEN 1 TABLET: 10; 325 TABLET ORAL at 02:09

## 2021-10-14 NOTE — CASE MANAGEMENT/SOCIAL WORK
Case Management Discharge Note      Final Note: Home.         Selected Continued Care - Discharged on 10/14/2021 Admission date: 10/13/2021 - Discharge disposition: Home or Self Care    Destination    No services have been selected for the patient.              Durable Medical Equipment    No services have been selected for the patient.              Dialysis/Infusion    No services have been selected for the patient.              Home Medical Care    No services have been selected for the patient.              Therapy    No services have been selected for the patient.              Community Resources    No services have been selected for the patient.              Community & DME    No services have been selected for the patient.                       Final Discharge Disposition Code: 01 - home or self-care

## 2021-10-14 NOTE — PLAN OF CARE
Goal Outcome Evaluation:               Pt A&Ox4, VSS, and pain well controlled with PRN meds. Pt being D/C's home with . D/C instructions, prescriptions, and equipment reviewed and sent with patient.

## 2021-10-14 NOTE — PROGRESS NOTES
Orthopedic Progress Note    Subjective :   Patient seen and examined today.  Patient is resting comfortably in hospital bed.  Patient cervical collar is intact.  JAZMYN drain intact with no drainage.  Patient denies numbness or tingling of either upper extremity.  Patient denies weakness of either upper extremity.  Patient states minimal pain of the neck.  Patient denies difficulty swallowing.  Patient has a baseline left foot drop.    Objective :    Vital signs in last 24 hours:  Temp:  [97 °F (36.1 °C)-97.9 °F (36.6 °C)] 97 °F (36.1 °C)  Heart Rate:  [71-97] 71  Resp:  [14-20] 16  BP: (120-139)/(58-79) 128/58  Vitals:    10/13/21 2001 10/13/21 2100 10/13/21 2307 10/14/21 0355   BP: 126/72 121/62 120/71 128/58   BP Location: Right arm Right arm Right arm Right arm   Patient Position: Lying Lying Lying Lying   Pulse: 86 82 78 71   Resp: 16 16 16 16   Temp: 97 °F (36.1 °C) 97.2 °F (36.2 °C) 97 °F (36.1 °C) 97 °F (36.1 °C)   TempSrc: Oral Oral Oral Oral   SpO2: 100% 100% 100% 100%   Weight:       Height:           PHYSICAL EXAM:  Patient is calm, in no acute distress, awake and oriented x 3.  Dressing clean, dry and intact.  JAZMYN drain intact without drainage.  No signs of infection.  Swelling is appropriate.  No ecchymosis.  Full range of motion of the elbow, wrist, hand.  Intact brachial radialis, ECRL, FCR, FDS, interossei.  Intact radial, median, ulnar nerves.  Equal sensation to light touch bilaterally C3-T1 dermatomes.  Patient is neurovascularly intact distally.      LABS:  Results from last 7 days   Lab Units 10/11/21  1030   WBC 10*3/mm3 7.90   HEMOGLOBIN g/dL 12.2   HEMATOCRIT % 36.9   PLATELETS 10*3/mm3 331     Results from last 7 days   Lab Units 10/11/21  1030   SODIUM mmol/L 136   POTASSIUM mmol/L 4.2   CHLORIDE mmol/L 96*   CO2 mmol/L 26.8   BUN mg/dL 22   CREATININE mg/dL 1.77*   GLUCOSE mg/dL 120*   CALCIUM mg/dL 9.6     Results from last 7 days   Lab Units 10/11/21  1030   INR  1.00   APTT seconds 28.7        ASSESSMENT:  Status post ACDF C4-7 with allograft, postop day 01  Plan:  JAZMYN drain removed.  Surgical dressing dry, clean, intact.  Continue Physical Therapy, avoid range of motion of the cervical spine.  Maintain cervical collar.  Continue SCDs for DVT prophylaxis.  Dispo planning for home when medically stable, likely today.  Patient will follow up in Dr. Gaytan's office in 2-3 weeks.    Kathleen Mota PA-C    Date: 10/14/2021  Time: 06:57 EDT

## 2021-10-14 NOTE — PLAN OF CARE
Goal Outcome Evaluation:  Plan of Care Reviewed With: patient        Progress: no change  Outcome Summary: Pt is a 74 yo female s/p anterior cervical discectomy and fusion of C4-7 with allograft bone 10/13/21. Aspen collar worn throughout session. Pt reports she lives with  in house with 2 DAMARI, was using a cane and L AFO for foot drop, and was independent with mobility prior to admission. Pt has walker at home if needed. Pt currently transfers and ambulates 60ft with walker with SBA demonstrating impaired gait from L foot drop and impaired strength. Pt could benefit from skilled PT to address deficits to improve functional mobility. PT recommends  PT to facilitate progress    Patient was not wearing a face mask during this therapy encounter. Therapist used appropriate personal protective equipment including eye protection, mask, and gloves.  Mask used was standard procedure mask. Appropriate PPE was worn during the entire therapy session. Hand hygiene was completed before and after therapy session. Patient is not in enhanced droplet precautions.

## 2021-10-14 NOTE — DISCHARGE SUMMARY
"    Discharge Summary    Date of Admission: 10/13/2021  9:54 AM  Date of Discharge:  10/14/2021    Discharge Diagnosis:   Cervical stenosis of spinal canal [M48.02]    PMHX:   Past Medical History:   Diagnosis Date   • Carotid stenosis     STATES 60 PERCENT BOTH SIDES. FOLLOWED YEARLY DR OCASIO   • Cervical disc disorder    • Chronic back pain    • DDD (degenerative disc disease), cervical    • Diverticular disease    • Foot drop, left foot     WEARS BRACE LEFT LEG   • GERD (gastroesophageal reflux disease)    • History of anemia    • History of kidney stones    • History of migraine    • Hypertension    • IBS (irritable bowel syndrome)    • Intestine disorder     intestines twisted, did not require surgical intervention, \"it straightened itself out\"   • Acevedo's neuroma of right foot     had surgery   • Seasonal allergies    • Stroke (HCC)     \"i re walk to my right\" ; left sided weakness       Discharge Disposition  Home or Self Care    Procedures Performed  Procedure(s):  ANTERIOR CERVICAL DISCECTOMY AND FUSION C4 -C7 WITH APPLICATION OF ALLOGRAFT BONE       Indication for Admission  Patient is a 75 y.o. female with a long standing history of cervical spine pain and radiculopathy. Patient was found to be a candidate for ACDF C4-7. Risks,benefits, outcomes discussed and the patient wished to proceed. Obtained pre-operative medical clearance and was found medically stable to undergo the above procedure. The patient tolerated the procedure well and was admitted after undergoing the above surgical procedure. They were admitted for post-operative pain control, medical management and physical therapy. Antibiotics per SCIP protocol was immediately started. Patient's post-operative pain was controlled, laboratory studies and vital signs remained stable and was found medically stable for dischage.    Consults:   Consults     Date and Time Order Name Status Description    10/13/2021  7:39 PM Inpatient Hospitalist Consult  "           Discharge Instructions:  Patient is to wear cervical collar.  Patient is to limit range of motion of cervical spine.  No Driving for 6 weeks.  The dressing is waterproof, and the patient may shower.  Keep dressing in place until post op day 7. Dressing may be changed if saturated or starts to fall off.  Patient will follow-up in the office in 2-3 weeks.  Call the office at [phone] for any questions or concerns.      Discharge Medications: Per the discharge medication reconciliation list.    Discharge Diet: Regular home diet    Activity at Discharge: Patient to remain in cervical collar at all times until seen in the office for post-operative follow up except for bathing and range of motion exercises.     Follow-up Appointments: Follow-up in Dr. Gaytan's office in 2-3 weeks. Please call [phone] to schedule or confirm your appointment.     Kathleen Mota PA-C  10/14/21,  07:04 EDT

## 2021-10-14 NOTE — THERAPY EVALUATION
"Patient Name: Leandra Wesley  : 1945    MRN: [id number]                              Today's Date: 10/14/2021       Admit Date: 10/13/2021    Visit Dx:     ICD-10-CM ICD-9-CM   1. Cervical stenosis of spinal canal  M48.02 723.0     Patient Active Problem List   Diagnosis   • Cervical stenosis of spinal canal   • History of CVA (cerebrovascular accident)   • Hypertension   • GERD (gastroesophageal reflux disease)   • Carotid stenosis   • CKD (chronic kidney disease) stage 3, GFR 30-59 ml/min ([facility])     Past Medical History:   Diagnosis Date   • Carotid stenosis     STATES 60 PERCENT BOTH SIDES. FOLLOWED YEARLY DR OCASIO   • Cervical disc disorder    • Chronic back pain    • DDD (degenerative disc disease), cervical    • Diverticular disease    • Foot drop, left foot     WEARS BRACE LEFT LEG   • GERD (gastroesophageal reflux disease)    • History of anemia    • History of kidney stones    • History of migraine    • Hypertension    • IBS (irritable bowel syndrome)    • Intestine disorder     intestines twisted, did not require surgical intervention, \"it straightened itself out\"   • Acevedo's neuroma of right foot     had surgery   • Seasonal allergies    • Stroke ([facility])     \"i re walk to my right\" ; left sided weakness     Past Surgical History:   Procedure Laterality Date   • BLADDER SURGERY     • CATARACT EXTRACTION W/ INTRAOCULAR LENS IMPLANT      LEFT AND RIGHT   • CHOLECYSTECTOMY     • COLONOSCOPY     • FOOT NEUROMA SURGERY Right     4TH TOE   • HYSTERECTOMY     • TONSILLECTOMY        General Information     Row Name 10/14/21 1103          Physical Therapy Time and Intention    Document Type evaluation  -SR     Mode of Treatment physical therapy; individual therapy  -SR     Row Name 10/14/21 110          General Information    Patient Profile Reviewed yes  -SR     Prior Level of Function independent:; all household mobility  using walking stick; has L AFO for foot drop  -SR     Existing " Precautions/Restrictions fall; spinal; other (see comments)  aspen collar  -SR     Barriers to Rehab none identified  -SR     Row Name 10/14/21 1103          Living Environment    Lives With spouse  -SR     Row Name 10/14/21 1103          Home Main Entrance    Number of Stairs, Main Entrance two  -SR     Stair Railings, Main Entrance none  -SR     Row Name 10/14/21 1103          Cognition    Orientation Status (Cognition) oriented x 4  -SR     Row Name 10/14/21 1103          Safety Issues, Functional Mobility    Impairments Affecting Function (Mobility) range of motion (ROM); strength; balance; endurance/activity tolerance; pain  -SR     Comment, Safety Issues/Impairments (Mobility) gait belt and non slip socks for safety  -SR           User Key  (r) = Recorded By, (t) = Taken By, (c) = Cosigned By    Initials Name Provider Type    SR Sade Barnett Physical Therapist               Mobility     Row Name 10/14/21 1105          Bed Mobility    Bed Mobility supine-sit  -SR     Supine-Sit [place] (Bed Mobility) modified independence  -SR     Assistive Device (Bed Mobility) head of bed elevated  -SR     Row Name 10/14/21 1105          Transfers    Comment (Transfers) sit <> stand with walker CGA/SBA  -SR     Row Name 10/14/21 1105          Sit-Stand Transfer    Sit-Stand [place] (Transfers) contact guard; standby assist  -SR     Assistive Device (Sit-Stand Transfers) walker, standard  -SR     Row Name 10/14/21 1105          Gait/Stairs (Locomotion)    [place] Level (Gait) contact guard; standby assist  -SR     Assistive Device (Gait) walker, standard  -SR     Distance in Feet (Gait) 60ft  -SR     Left Sided Gait Deviations foot drop/toe drag  -SR     Comment (Gait/Stairs) Pt ambulated 60ft with walker CGAx1 progressing to SBA demonstrating good safety. Pt with L foot drop able to compensate independently with increased knee flexion for foot clearance. Normally has L AFO, however does not have in room at  this time but  is bringing before d/c  -SR           User Key  (r) = Recorded By, (t) = Taken By, (c) = Cosigned By    Initials Name Provider Type    SR Sade Barnett Physical Therapist               Obj/Interventions     Row Name 10/14/21 1107          Range of Motion Comprehensive    General Range of Motion no range of motion deficits identified  -SR     Row Name 10/14/21 1107          Strength Comprehensive (MMT)    General Manual Muscle Testing (MMT) Assessment lower extremity strength deficits identified  -SR     Comment, General Manual Muscle Testing (MMT) Assessment noted L foot drop with palpable contractions of ankle DF; all other motions in all other extremities >3/5 functionally tested  -SR     Row Name 10/14/21 1107          Motor Skills    Therapeutic Exercise --  10x ankle pumps; LAQ, marches BLE  -SR     Row Name 10/14/21 1107          Balance    Balance Assessment sitting dynamic balance; sitting static balance; standing static balance; standing dynamic balance  -SR     Static Sitting Balance WFL; sitting, edge of bed  -SR     Dynamic Sitting Balance WFL; sitting, edge of bed  -SR     Static Standing Balance WFL; supported  -SR     Dynamic Standing Balance mild impairment; supported  -SR     Comment, Balance CGA/SBA with walker for standing balance  -SR     Row Name 10/14/21 1107          Sensory Assessment (Somatosensory)    Sensory Assessment (Somatosensory) sensation intact  -SR           User Key  (r) = Recorded By, (t) = Taken By, (c) = Cosigned By    Initials Name Provider Type    SR Sade Barnett Physical Therapist               Goals/Plan     Row Name 10/14/21 1116          Transfer Goal 1 (PT)    Activity/Assistive Device (Transfer Goal 1, PT) transfers, all  -SR     [place] Level/Cues Needed (Transfer Goal 1, PT) independent  -SR     Time Frame (Transfer Goal 1, PT) 1 week  -SR     Row Name 10/14/21 1116          Gait Training Goal 1 (PT)    Activity/Assistive Device (Gait  Training Goal 1, PT) gait (walking locomotion)  -SR     [place] Level (Gait Training Goal 1, PT) standby assist  -SR     Distance (Gait Training Goal 1, PT) 150ft  -SR     Time Frame (Gait Training Goal 1, PT) 1 week  -SR           User Key  (r) = Recorded By, (t) = Taken By, (c) = Cosigned By    Initials Name Provider Type    SR Sade Barnett Physical Therapist               Clinical Impression     Row Name 10/14/21 1112          Pain    Additional Documentation Pain Scale: Numbers Pre/Post-Treatment (Group)  -SR     Row Name 10/14/21 1112          Pain Scale: Numbers Pre/Post-Treatment    Pretreatment Pain Rating 2/10  -SR     Posttreatment Pain Rating 2/10  -SR     Pain Location - Orientation incisional  -SR     Pain Location neck  -SR     Pain Intervention(s) Repositioned; Rest  -SR     Row Name 10/14/21 1112          Plan of Care Review    Plan of Care Reviewed With patient  -SR     Progress no change  -SR     Outcome Summary Pt is a 74 yo female s/p anterior cervical discectomy and fusion of C4-7 with allograft bone 10/13/21. Aspen collar worn throughout session. Pt reports she lives with  in house with 2 DAMARI, was using a cane and L AFO for foot drop, and was independent with mobility prior to admission. Pt has walker at home if needed. Pt currently transfers and ambulates 60ft with walker with SBA demonstrating impaired gait from L foot drop and impaired strength. Pt could benefit from skilled PT to address deficits to improve functional mobility. PT recommends  PT to facilitate progress  -SR     Row Name 10/14/21 1112          Therapy Assessment/Plan (PT)    Patient/Family Therapy Goals Statement (PT) go home  -SR     Rehab Potential (PT) good, to achieve stated therapy goals  -SR     Criteria for Skilled Interventions Met (PT) yes; meets criteria  -SR     Predicted Duration of Therapy Intervention (PT) 1 week  -SR     Row Name 10/14/21 1112          Positioning and Restraints    Pre-Treatment  Position in bed  -SR     Post Treatment Position bed  -SR     In Bed sitting EOB; call light within reach; encouraged to call for assist; notified nsg; with other staff  NP in room  -SR           User Key  (r) = Recorded By, (t) = Taken By, (c) = Cosigned By    Initials Name Provider Type    Sade Covington Physical Therapist               Outcome Measures     Row Name 10/14/21 1116          How much help from another person do you currently need...    Turning from your back to your side while in flat bed without using bedrails? 4  -SR     Moving from lying on back to sitting on the side of a flat bed without bedrails? 4  -SR     Moving to and from a bed to a chair (including a wheelchair)? 3  -SR     Standing up from a chair using your arms (e.g., wheelchair, bedside chair)? 3  -SR     Climbing 3-5 steps with a railing? 3  -SR     To walk in hospital room? 3  -SR     AM-PAC 6 Clicks Score (PT) 20  -SR     Row Name 10/14/21 1116          Functional Assessment    Outcome Measure Options AM-PAC 6 Clicks Basic Mobility (PT)  -SR           User Key  (r) = Recorded By, (t) = Taken By, (c) = Cosigned By    Initials Name Provider Type    Sade Covington Physical Therapist                             Physical Therapy Education                 Title: PT OT SLP Therapies (Done)     Topic: Physical Therapy (Done)     Point: Mobility training (Done)     Learning Progress Summary           Patient Acceptance, E, VU by SR at 10/14/2021 1116                   Point: Home exercise program (Done)     Learning Progress Summary           Patient Acceptance, E, VU by SR at 10/14/2021 1116                   Point: Body mechanics (Done)     Learning Progress Summary           Patient Acceptance, E, VU by SR at 10/14/2021 1116                   Point: Precautions (Done)     Learning Progress Summary           Patient Acceptance, E, VU by SR at 10/14/2021 1116                               User Key     Initials Effective Dates Name  Provider Type Discipline     02/08/21 -  Sade Barnett Physical Therapist PT              PT Recommendation and Plan  Planned Therapy Interventions (PT): balance training, bed mobility training, gait training, home exercise program, manual therapy techniques, neuromuscular re-education, patient/family education, postural re-education, stair training, strengthening, stretching, ROM (range of motion), transfer training  Plan of Care Reviewed With: patient  Progress: no change  Outcome Summary: Pt is a 74 yo female s/p anterior cervical discectomy and fusion of C4-7 with allograft bone 10/13/21. Aspen collar worn throughout session. Pt reports she lives with  in house with 2 DAMARI, was using a cane and L AFO for foot drop, and was independent with mobility prior to admission. Pt has walker at home if needed. Pt currently transfers and ambulates 60ft with walker with SBA demonstrating impaired gait from L foot drop and impaired strength. Pt could benefit from skilled PT to address deficits to improve functional mobility. PT recommends  PT to facilitate progress     Time Calculation:    PT Charges     Row Name 10/14/21 1117             Time Calculation    Start Time 0857  -SR      Stop Time 0921  -SR      Time Calculation (min) 24 min  -SR      PT Received On 10/14/21  -SR      PT - Next Appointment 10/15/21  -      PT Goal Re-Cert Due Date 10/21/21  -              Time Calculation- PT    Total Timed Code Minutes- PT 24 minute(s)  -SR            User Key  (r) = Recorded By, (t) = Taken By, (c) = Cosigned By    Initials Name Provider Type    SR Sade Barnett Physical Therapist              Therapy Charges for Today     Code Description Service Date Service Provider Modifiers Qty    99211710723 HC PT EVAL MOD COMPLEXITY 2 10/14/2021 Sade Barnett GP 1    54737903208 HC PT THER PROC EA 15 MIN 10/14/2021 Sade Barnett GP 1          PT G-Codes  Outcome Measure Options: AM-PAC 6 Clicks Basic Mobility (PT)  AM-PAC  6 Clicks Score (PT): 20    Sade Barnett  10/14/2021

## 2021-10-14 NOTE — PLAN OF CARE
Goal Outcome Evaluation:  Plan of Care Reviewed With: patient        Progress: improving  Outcome Summary: POD#1 OF CERVICAL DISCECTOMY AND FUSION C4-C7 WITH APPLICATION OF ALLOGRAFT BONE. HARD COLLAR  IN PLACE.  DRESSING TO NECT DRY/INTACT. JAZMYN DRAIN IN PLACE. VSS. PO/IV PAIN MEDICATION HELPING WITH PAIN. UP ASSIST X1 TO BSC. FOOT DROP ON THE LEFT. EDUCATION PROVIDED ON BP MONITORING AND REFLUX. PATIENT VERBALIZED UNDERSTANDING. WILL CONTINUE TO MONITOR

## 2021-10-14 NOTE — CONSULTS
Patient Name:  Leandra Wesley  YOB: 1945  MRN:  [id number]  Date of Admission:  10/13/2021  Date of Consult:  10/14/2021  Patient Care Team:  Soila Young MD as PCP - General  Soila Young MD as PCP - Family Medicine    Consults   Evaluate status and make recommendations regarding treatment for medical management particularly HTN and CKD.    Subjective   History of Present Illness  Ms. Wesley is a 75 y.o. female that has been admitted to [facility] following elective ANTERIOR CERVICAL DISCECTOMY AND FUSION C4 -C7 WITH APPLICATION OF ALLOGRAFT BONE.  She has been admitted to an orthopedic floor following surgery and we were asked to see and assist with her medical problems, specifically relating to her HTN and CKD.     The patient has done very well post operatively and has already been discharged by the orthopedic surgeons. The patient states she feels good but does have chronic left foot pain related to her foot drop as well as having expected neck pain. She is not requiring any oxygen therapy and denies any prior history of heart or lung problems. She does have known CKD and is followed by her PCP for this reason. She is unsure what her baseline creatinine is but prior values were 1.3 in 2017 and 2019 as well as 1.6 in June of this year. Her creatinine preoperatively was 1.7. The patient denies any dizziness, lightheadedness, nausea, vomiting or diarrhea. She is tolerating a diet and has been ambulating well with therapy at this point. She does report being recently given Macrobid for a UTI by the surgeon, but denies any dysuria. Her culture grew out mixed césar and the patient states she only took one dose given some nausea the medication was causing. She otherwise feels eager for discharge home and is doing well. She was advised to see her PCP in the next week for repeat renal function testing.    Past Medical History:   Diagnosis Date   • Carotid stenosis     STATES 60 PERCENT  "BOTH SIDES. FOLLOWED YEARLY DR OCASIO   • Cervical disc disorder    • Chronic back pain    • DDD (degenerative disc disease), cervical    • Diverticular disease    • Foot drop, left foot     WEARS BRACE LEFT LEG   • GERD (gastroesophageal reflux disease)    • History of anemia    • History of kidney stones    • History of migraine    • Hypertension    • IBS (irritable bowel syndrome)    • Intestine disorder     intestines twisted, did not require surgical intervention, \"it straightened itself out\"   • Acevedo's neuroma of right foot     had surgery   • Seasonal allergies    • Stroke (H[facility])     \"i re walk to my right\" ; left sided weakness     Past Surgical History:   Procedure Laterality Date   • BLADDER SURGERY     • CATARACT EXTRACTION W/ INTRAOCULAR LENS IMPLANT      LEFT AND RIGHT   • CHOLECYSTECTOMY     • COLONOSCOPY     • FOOT NEUROMA SURGERY Right     4TH TOE   • HYSTERECTOMY     • TONSILLECTOMY       Family History   Problem Relation Age of Onset   • Malig Hyperthermia Neg Hx      Social History     Tobacco Use   • Smoking status: Former Smoker   • Smokeless tobacco: Never Used   • Tobacco comment: smoked as teen   Vaping Use   • Vaping Use: Never used   Substance Use Topics   • Alcohol use: No   • Drug use: No     Medications Prior to Admission   Medication Sig Dispense Refill Last Dose   • amLODIPine (NORVASC) 10 MG tablet Take 10 mg by mouth Daily.   10/13/2021 at 0830   • cetirizine-pseudoephedrine (ZyrTEC-D) 5-120 MG per 12 hr tablet Take 1 tablet by mouth 2 (Two) Times a Day.   10/12/2021 at 2200   • Esomeprazole Magnesium (NEXIUM PO) Take 20 mg by mouth Every Evening.   10/12/2021 at 2200   • eszopiclone (LUNESTA) 3 MG tablet Take 3 mg by mouth Every Night. Take immediately before bedtime   10/12/2021 at 2200   • HYDROcodone-acetaminophen (NORCO) 5-325 MG per tablet Take 1 tablet by mouth 3 (Three) Times a Day.   10/12/2021 at 1800   • losartan-hydrochlorothiazide (HYZAAR) 100-25 MG per tablet Take 1 " tablet by mouth Daily.   10/13/2021 at 0830   • aspirin 81 MG EC tablet Take 81 mg by mouth Daily. WILL HOLD FOR SURGERY   10/11/2021   • Melatonin 3 MG tablet Take 3 mg by mouth Every Night.   10/9/2021     Allergies:  Eggs or egg-derived products, Iodine, Penicillins, and Sulfa antibiotics    Review of Systems   Constitutional: Negative for activity change, appetite change, chills, fatigue and fever.   HENT: Negative for congestion and ear pain.    Eyes: Negative for pain and discharge.   Respiratory: Negative for cough, chest tightness and shortness of breath.    Cardiovascular: Negative for chest pain and leg swelling.   Gastrointestinal: Negative for abdominal distention, abdominal pain, diarrhea, nausea and vomiting.   Genitourinary: Negative for difficulty urinating and dysuria.   Musculoskeletal: Positive for arthralgias. Negative for gait problem.   Skin: Negative for color change and pallor.   Neurological: Negative for dizziness and light-headedness.   Psychiatric/Behavioral: Negative for confusion. The patient is not nervous/anxious.       Objective      Vital Signs  Temp:  [97 °F (36.1 °C)-97.9 °F (36.6 °C)] 97.5 °F (36.4 °C)  Heart Rate:  [69-97] 69  Resp:  [14-20] 16  BP: (120-139)/(58-79) 132/74  Body mass index is 32.08 kg/m².    Physical Exam  Vitals and nursing note reviewed.   Constitutional:       General: She is not in acute distress.     Appearance: She is not toxic-appearing.   HENT:      Head: Normocephalic and atraumatic.   Neck:      Comments: Hard cervical collar in place.  Cardiovascular:      Rate and Rhythm: Normal rate and regular rhythm.      Pulses: Normal pulses.      Heart sounds: Normal heart sounds.   Pulmonary:      Effort: Pulmonary effort is normal. No respiratory distress.      Breath sounds: Normal breath sounds.   Abdominal:      General: Bowel sounds are normal. There is no distension.      Palpations: Abdomen is soft.      Tenderness: There is no abdominal tenderness.    Musculoskeletal:         General: No swelling or tenderness.      Comments: Left foot drop noted   Skin:     General: Skin is warm and dry.      Comments: Neck dressing intact.   Neurological:      Mental Status: She is alert and oriented to person, place, and time.      Sensory: No sensory deficit.      Coordination: Coordination normal.   Psychiatric:         Mood and Affect: Mood normal.         Behavior: Behavior normal.        Results Review:   I reviewed the patient's new clinical results.  I reviewed the patient's new imaging results and agree with the interpretation.  I reviewed the patient's other test results and agree with the interpretation  I personally viewed and interpreted the patient's EKG/Telemetry data       Assessment/Plan     Active Hospital Problems    Diagnosis  POA   • **Cervical stenosis of spinal canal [M48.02]  Yes   • History of CVA (cerebrovascular accident) [Z86.73]  Not Applicable   • Hypertension [I10]  Yes   • GERD (gastroesophageal reflux disease) [K21.9]  Yes   • Carotid stenosis [I65.29]  Yes     STATES 60 PERCENT BOTH SIDES. FOLLOWED YEARLY DR OCASIO     • CKD (chronic kidney disease) stage 3, GFR 30-59 ml/min (HCC) [N18.30]  Yes     Ms. Wesley is a 75 y.o. female who is s/p ANTERIOR CERVICAL DISCECTOMY AND FUSION C4 -C7 WITH APPLICATION OF ALLOGRAFT BONE.    · CKD3: Creatinine was 1.7 on preoperative labs and 1.6 several months prior. This appears to be her baseline at this point. The patient's BP has been stable and she is eating and drinking. Recommend that she follow up with her PCP in 1 week for repeat renal function to ensure stability.  · HTN: BP stable. Her BP medications were held post operatively by the attending. Have advised she resume her medications as long as she checks her BP daily prior to taking these meds and hold if SBP <100. She should follow up with PCP as above.  · History of CVA: Appears stable. No neurological deficits other than baseline. Resume  ASA.  · GERD: Resume PPI.  · Carotid stenosis: Followed by Dr. Lemus. Follow up as scheduled.     Discussed with patient and nursing staff.    Thank you very much for asking [facility] to be involved in this patient's care. She should see her PCP as above, but otherwise appears to be better than average following surgery. Discharge education was provided.      CHRISTINA Alamo  [facility]  10/14/21  09:31 EDT

## 2022-08-01 ENCOUNTER — TRANSCRIBE ORDERS (OUTPATIENT)
Dept: ADMINISTRATIVE | Facility: HOSPITAL | Age: 77
End: 2022-08-01

## 2022-08-01 DIAGNOSIS — R19.7 DIARRHEA, UNSPECIFIED TYPE: ICD-10-CM

## 2022-08-01 DIAGNOSIS — R10.9 ABDOMINAL PAIN, UNSPECIFIED ABDOMINAL LOCATION: Primary | ICD-10-CM

## 2022-08-03 ENCOUNTER — APPOINTMENT (OUTPATIENT)
Dept: CT IMAGING | Facility: HOSPITAL | Age: 77
End: 2022-08-03

## 2022-08-03 ENCOUNTER — HOSPITAL ENCOUNTER (OUTPATIENT)
Dept: CT IMAGING | Facility: HOSPITAL | Age: 77
Discharge: HOME | End: 2022-08-03
Admitting: INTERNAL MEDICINE

## 2022-08-03 DIAGNOSIS — R10.9 ABDOMINAL PAIN, UNSPECIFIED ABDOMINAL LOCATION: ICD-10-CM

## 2022-08-03 DIAGNOSIS — R19.7 DIARRHEA, UNSPECIFIED TYPE: ICD-10-CM

## 2022-08-03 PROCEDURE — 74176 CT ABD & PELVIS W/O CONTRAST: CPT

## 2022-08-26 ENCOUNTER — APPOINTMENT (OUTPATIENT)
Dept: CT IMAGING | Facility: HOSPITAL | Age: 77
End: 2022-08-26

## 2022-08-26 ENCOUNTER — HOSPITAL ENCOUNTER (OUTPATIENT)
Facility: HOSPITAL | Age: 77
Setting detail: OBSERVATION
Discharge: HOME | End: 2022-08-27
Attending: EMERGENCY MEDICINE | Admitting: EMERGENCY MEDICINE

## 2022-08-26 ENCOUNTER — APPOINTMENT (OUTPATIENT)
Dept: MRI IMAGING | Facility: HOSPITAL | Age: 77
End: 2022-08-26

## 2022-08-26 DIAGNOSIS — U07.1 COVID-19 VIRUS INFECTION: ICD-10-CM

## 2022-08-26 DIAGNOSIS — R51.9 ACUTE INTRACTABLE HEADACHE, UNSPECIFIED HEADACHE TYPE: Primary | ICD-10-CM

## 2022-08-26 LAB
ANION GAP SERPL CALCULATED.3IONS-SCNC: 11 MMOL/L (ref 5–15)
B PARAPERT DNA SPEC QL NAA+PROBE: NOT DETECTED
B PERT DNA SPEC QL NAA+PROBE: NOT DETECTED
BASOPHILS # BLD AUTO: 0.04 10*3/MM3 (ref 0–0.2)
BASOPHILS NFR BLD AUTO: 0.6 % (ref 0–1.5)
BUN SERPL-MCNC: 17 MG/DL (ref 8–23)
BUN/CREAT SERPL: 11.3 (ref 7–25)
C PNEUM DNA NPH QL NAA+NON-PROBE: NOT DETECTED
CALCIUM SPEC-SCNC: 9.5 MG/DL (ref 8.6–10.5)
CHLORIDE SERPL-SCNC: 102 MMOL/L (ref 98–107)
CO2 SERPL-SCNC: 26 MMOL/L (ref 22–29)
CREAT SERPL-MCNC: 1.5 MG/DL (ref 0.57–1)
DEPRECATED RDW RBC AUTO: 41.2 FL (ref 37–54)
EGFRCR SERPLBLD CKD-EPI 2021: 35.7 ML/MIN/1.73
EOSINOPHIL # BLD AUTO: 0.09 10*3/MM3 (ref 0–0.4)
EOSINOPHIL NFR BLD AUTO: 1.3 % (ref 0.3–6.2)
ERYTHROCYTE [DISTWIDTH] IN BLOOD BY AUTOMATED COUNT: 13.4 % (ref 12.3–15.4)
FLUAV SUBTYP SPEC NAA+PROBE: NOT DETECTED
FLUBV RNA ISLT QL NAA+PROBE: NOT DETECTED
GLUCOSE SERPL-MCNC: 95 MG/DL (ref 65–99)
HADV DNA SPEC NAA+PROBE: NOT DETECTED
HCOV 229E RNA SPEC QL NAA+PROBE: NOT DETECTED
HCOV HKU1 RNA SPEC QL NAA+PROBE: NOT DETECTED
HCOV NL63 RNA SPEC QL NAA+PROBE: NOT DETECTED
HCOV OC43 RNA SPEC QL NAA+PROBE: NOT DETECTED
HCT VFR BLD AUTO: 37.1 % (ref 34–46.6)
HGB BLD-MCNC: 11.8 G/DL (ref 12–15.9)
HMPV RNA NPH QL NAA+NON-PROBE: NOT DETECTED
HPIV1 RNA ISLT QL NAA+PROBE: NOT DETECTED
HPIV2 RNA SPEC QL NAA+PROBE: NOT DETECTED
HPIV3 RNA NPH QL NAA+PROBE: NOT DETECTED
HPIV4 P GENE NPH QL NAA+PROBE: NOT DETECTED
IMM GRANULOCYTES # BLD AUTO: 0.02 10*3/MM3 (ref 0–0.05)
IMM GRANULOCYTES NFR BLD AUTO: 0.3 % (ref 0–0.5)
LYMPHOCYTES # BLD AUTO: 0.52 10*3/MM3 (ref 0.7–3.1)
LYMPHOCYTES NFR BLD AUTO: 7.6 % (ref 19.6–45.3)
M PNEUMO IGG SER IA-ACNC: NOT DETECTED
MCH RBC QN AUTO: 27.1 PG (ref 26.6–33)
MCHC RBC AUTO-ENTMCNC: 31.8 G/DL (ref 31.5–35.7)
MCV RBC AUTO: 85.1 FL (ref 79–97)
MONOCYTES # BLD AUTO: 0.48 10*3/MM3 (ref 0.1–0.9)
MONOCYTES NFR BLD AUTO: 7 % (ref 5–12)
NEUTROPHILS NFR BLD AUTO: 5.7 10*3/MM3 (ref 1.7–7)
NEUTROPHILS NFR BLD AUTO: 83.2 % (ref 42.7–76)
NRBC BLD AUTO-RTO: 0 /100 WBC (ref 0–0.2)
PLATELET # BLD AUTO: 269 10*3/MM3 (ref 140–450)
PMV BLD AUTO: 10.7 FL (ref 6–12)
POTASSIUM SERPL-SCNC: 3.9 MMOL/L (ref 3.5–5.2)
RBC # BLD AUTO: 4.36 10*6/MM3 (ref 3.77–5.28)
RHINOVIRUS RNA SPEC NAA+PROBE: NOT DETECTED
RSV RNA NPH QL NAA+NON-PROBE: NOT DETECTED
SARS-COV-2 RNA NPH QL NAA+NON-PROBE: DETECTED
SODIUM SERPL-SCNC: 139 MMOL/L (ref 136–145)
WBC NRBC COR # BLD: 6.85 10*3/MM3 (ref 3.4–10.8)

## 2022-08-26 PROCEDURE — 80048 BASIC METABOLIC PNL TOTAL CA: CPT | Performed by: EMERGENCY MEDICINE

## 2022-08-26 PROCEDURE — 25010000002 PROCHLORPERAZINE 10 MG/2ML SOLUTION: Performed by: EMERGENCY MEDICINE

## 2022-08-26 PROCEDURE — 99284 EMERGENCY DEPT VISIT MOD MDM: CPT

## 2022-08-26 PROCEDURE — 25010000002 DROPERIDOL PER 5 MG: Performed by: PHYSICIAN ASSISTANT

## 2022-08-26 PROCEDURE — 96366 THER/PROPH/DIAG IV INF ADDON: CPT

## 2022-08-26 PROCEDURE — 96375 TX/PRO/DX INJ NEW DRUG ADDON: CPT

## 2022-08-26 PROCEDURE — 70551 MRI BRAIN STEM W/O DYE: CPT

## 2022-08-26 PROCEDURE — 25010000002 MAGNESIUM SULFATE 2 GM/50ML SOLUTION: Performed by: NURSE PRACTITIONER

## 2022-08-26 PROCEDURE — 96365 THER/PROPH/DIAG IV INF INIT: CPT

## 2022-08-26 PROCEDURE — 0202U NFCT DS 22 TRGT SARS-COV-2: CPT | Performed by: EMERGENCY MEDICINE

## 2022-08-26 PROCEDURE — 96376 TX/PRO/DX INJ SAME DRUG ADON: CPT

## 2022-08-26 PROCEDURE — 25010000002 DIPHENHYDRAMINE PER 50 MG: Performed by: EMERGENCY MEDICINE

## 2022-08-26 PROCEDURE — G0378 HOSPITAL OBSERVATION PER HR: HCPCS

## 2022-08-26 PROCEDURE — 70450 CT HEAD/BRAIN W/O DYE: CPT

## 2022-08-26 PROCEDURE — 25010000002 KETOROLAC TROMETHAMINE PER 15 MG: Performed by: EMERGENCY MEDICINE

## 2022-08-26 PROCEDURE — 85025 COMPLETE CBC W/AUTO DIFF WBC: CPT | Performed by: EMERGENCY MEDICINE

## 2022-08-26 PROCEDURE — 96361 HYDRATE IV INFUSION ADD-ON: CPT

## 2022-08-26 RX ORDER — NITROGLYCERIN 0.4 MG/1
0.4 TABLET SUBLINGUAL
Status: DISCONTINUED | OUTPATIENT
Start: 2022-08-26 | End: 2022-08-27 | Stop reason: HOSPADM

## 2022-08-26 RX ORDER — MAGNESIUM SULFATE HEPTAHYDRATE 40 MG/ML
2 INJECTION, SOLUTION INTRAVENOUS ONCE
Status: COMPLETED | OUTPATIENT
Start: 2022-08-26 | End: 2022-08-26

## 2022-08-26 RX ORDER — SODIUM CHLORIDE 9 MG/ML
125 INJECTION, SOLUTION INTRAVENOUS CONTINUOUS
Status: DISCONTINUED | OUTPATIENT
Start: 2022-08-26 | End: 2022-08-27 | Stop reason: HOSPADM

## 2022-08-26 RX ORDER — DROPERIDOL 2.5 MG/ML
1.25 INJECTION, SOLUTION INTRAMUSCULAR; INTRAVENOUS ONCE
Status: COMPLETED | OUTPATIENT
Start: 2022-08-26 | End: 2022-08-26

## 2022-08-26 RX ORDER — PANTOPRAZOLE SODIUM 40 MG/1
40 TABLET, DELAYED RELEASE ORAL EVERY MORNING
Status: DISCONTINUED | OUTPATIENT
Start: 2022-08-27 | End: 2022-08-27 | Stop reason: HOSPADM

## 2022-08-26 RX ORDER — BUTALBITAL, ACETAMINOPHEN AND CAFFEINE 50; 325; 40 MG/1; MG/1; MG/1
1 TABLET ORAL EVERY 4 HOURS PRN
Status: DISCONTINUED | OUTPATIENT
Start: 2022-08-26 | End: 2022-08-27

## 2022-08-26 RX ORDER — AMLODIPINE BESYLATE 10 MG/1
10 TABLET ORAL DAILY
Status: DISCONTINUED | OUTPATIENT
Start: 2022-08-26 | End: 2022-08-27 | Stop reason: HOSPADM

## 2022-08-26 RX ORDER — UREA 10 %
3 LOTION (ML) TOPICAL NIGHTLY PRN
Status: DISCONTINUED | OUTPATIENT
Start: 2022-08-26 | End: 2022-08-27 | Stop reason: HOSPADM

## 2022-08-26 RX ORDER — ONDANSETRON 2 MG/ML
4 INJECTION INTRAMUSCULAR; INTRAVENOUS EVERY 6 HOURS PRN
Status: DISCONTINUED | OUTPATIENT
Start: 2022-08-26 | End: 2022-08-27 | Stop reason: HOSPADM

## 2022-08-26 RX ORDER — SODIUM CHLORIDE 0.9 % (FLUSH) 0.9 %
10 SYRINGE (ML) INJECTION EVERY 12 HOURS SCHEDULED
Status: DISCONTINUED | OUTPATIENT
Start: 2022-08-26 | End: 2022-08-27 | Stop reason: HOSPADM

## 2022-08-26 RX ORDER — PROCHLORPERAZINE EDISYLATE 5 MG/ML
5 INJECTION INTRAMUSCULAR; INTRAVENOUS ONCE
Status: COMPLETED | OUTPATIENT
Start: 2022-08-26 | End: 2022-08-26

## 2022-08-26 RX ORDER — ZOLPIDEM TARTRATE 5 MG/1
5 TABLET ORAL NIGHTLY PRN
Status: DISCONTINUED | OUTPATIENT
Start: 2022-08-26 | End: 2022-08-27 | Stop reason: HOSPADM

## 2022-08-26 RX ORDER — DIPHENHYDRAMINE HYDROCHLORIDE 50 MG/ML
25 INJECTION INTRAMUSCULAR; INTRAVENOUS ONCE
Status: COMPLETED | OUTPATIENT
Start: 2022-08-26 | End: 2022-08-26

## 2022-08-26 RX ORDER — SODIUM CHLORIDE 0.9 % (FLUSH) 0.9 %
10 SYRINGE (ML) INJECTION AS NEEDED
Status: DISCONTINUED | OUTPATIENT
Start: 2022-08-26 | End: 2022-08-27 | Stop reason: HOSPADM

## 2022-08-26 RX ORDER — ONDANSETRON 4 MG/1
4 TABLET, FILM COATED ORAL EVERY 6 HOURS PRN
Status: DISCONTINUED | OUTPATIENT
Start: 2022-08-26 | End: 2022-08-27 | Stop reason: HOSPADM

## 2022-08-26 RX ORDER — KETOROLAC TROMETHAMINE 15 MG/ML
15 INJECTION, SOLUTION INTRAMUSCULAR; INTRAVENOUS ONCE
Status: COMPLETED | OUTPATIENT
Start: 2022-08-26 | End: 2022-08-26

## 2022-08-26 RX ORDER — ACETAMINOPHEN 325 MG/1
650 TABLET ORAL EVERY 4 HOURS PRN
Status: DISCONTINUED | OUTPATIENT
Start: 2022-08-26 | End: 2022-08-27 | Stop reason: HOSPADM

## 2022-08-26 RX ADMIN — SODIUM CHLORIDE 500 ML: 9 INJECTION, SOLUTION INTRAVENOUS at 15:32

## 2022-08-26 RX ADMIN — KETOROLAC TROMETHAMINE 15 MG: 15 INJECTION, SOLUTION INTRAMUSCULAR; INTRAVENOUS at 17:19

## 2022-08-26 RX ADMIN — BUTALBITAL, ACETAMINOPHEN, AND CAFFEINE 1 TABLET: 50; 325; 40 TABLET ORAL at 20:21

## 2022-08-26 RX ADMIN — SODIUM CHLORIDE 125 ML/HR: 9 INJECTION, SOLUTION INTRAVENOUS at 23:37

## 2022-08-26 RX ADMIN — DIPHENHYDRAMINE HYDROCHLORIDE 25 MG: 50 INJECTION INTRAMUSCULAR; INTRAVENOUS at 17:20

## 2022-08-26 RX ADMIN — PROCHLORPERAZINE EDISYLATE 5 MG: 5 INJECTION INTRAMUSCULAR; INTRAVENOUS at 15:32

## 2022-08-26 RX ADMIN — PROCHLORPERAZINE EDISYLATE 5 MG: 5 INJECTION INTRAMUSCULAR; INTRAVENOUS at 17:20

## 2022-08-26 RX ADMIN — MAGNESIUM SULFATE HEPTAHYDRATE 2 G: 2 INJECTION, SOLUTION INTRAVENOUS at 18:24

## 2022-08-26 RX ADMIN — DIPHENHYDRAMINE HYDROCHLORIDE 25 MG: 50 INJECTION INTRAMUSCULAR; INTRAVENOUS at 15:32

## 2022-08-26 RX ADMIN — DROPERIDOL 1.25 MG: 2.5 INJECTION, SOLUTION INTRAMUSCULAR; INTRAVENOUS at 23:37

## 2022-08-27 ENCOUNTER — READMISSION MANAGEMENT (OUTPATIENT)
Dept: CALL CENTER | Facility: HOSPITAL | Age: 77
End: 2022-08-27

## 2022-08-27 VITALS
TEMPERATURE: 97.7 F | SYSTOLIC BLOOD PRESSURE: 101 MMHG | OXYGEN SATURATION: 100 % | HEART RATE: 61 BPM | HEIGHT: 63 IN | RESPIRATION RATE: 16 BRPM | WEIGHT: 168 LBS | BODY MASS INDEX: 29.77 KG/M2 | DIASTOLIC BLOOD PRESSURE: 56 MMHG

## 2022-08-27 PROBLEM — G43.019 HEADACHE, COMMON MIGRAINE, INTRACTABLE: Status: ACTIVE | Noted: 2022-08-27

## 2022-08-27 PROBLEM — R51.9 HEADACHE: Status: RESOLVED | Noted: 2022-08-26 | Resolved: 2022-08-27

## 2022-08-27 PROBLEM — U07.1 COVID-19 VIRUS DETECTED: Status: ACTIVE | Noted: 2022-08-27

## 2022-08-27 LAB
ANION GAP SERPL CALCULATED.3IONS-SCNC: 13 MMOL/L (ref 5–15)
BUN SERPL-MCNC: 14 MG/DL (ref 8–23)
BUN/CREAT SERPL: 10.4 (ref 7–25)
CALCIUM SPEC-SCNC: 8.4 MG/DL (ref 8.6–10.5)
CHLORIDE SERPL-SCNC: 102 MMOL/L (ref 98–107)
CO2 SERPL-SCNC: 24 MMOL/L (ref 22–29)
CREAT SERPL-MCNC: 1.34 MG/DL (ref 0.57–1)
EGFRCR SERPLBLD CKD-EPI 2021: 40.9 ML/MIN/1.73
GLUCOSE SERPL-MCNC: 164 MG/DL (ref 65–99)
POTASSIUM SERPL-SCNC: 3.4 MMOL/L (ref 3.5–5.2)
SODIUM SERPL-SCNC: 139 MMOL/L (ref 136–145)

## 2022-08-27 PROCEDURE — 25010000002 ONDANSETRON PER 1 MG: Performed by: EMERGENCY MEDICINE

## 2022-08-27 PROCEDURE — 96366 THER/PROPH/DIAG IV INF ADDON: CPT

## 2022-08-27 PROCEDURE — 99203 OFFICE O/P NEW LOW 30 MIN: CPT | Performed by: PSYCHIATRY & NEUROLOGY

## 2022-08-27 PROCEDURE — 80048 BASIC METABOLIC PNL TOTAL CA: CPT | Performed by: STUDENT IN AN ORGANIZED HEALTH CARE EDUCATION/TRAINING PROGRAM

## 2022-08-27 PROCEDURE — 96361 HYDRATE IV INFUSION ADD-ON: CPT

## 2022-08-27 PROCEDURE — 96376 TX/PRO/DX INJ SAME DRUG ADON: CPT

## 2022-08-27 PROCEDURE — 25010000002 KETOROLAC TROMETHAMINE PER 15 MG: Performed by: EMERGENCY MEDICINE

## 2022-08-27 PROCEDURE — 93010 ELECTROCARDIOGRAM REPORT: CPT | Performed by: INTERNAL MEDICINE

## 2022-08-27 PROCEDURE — 25010000002 DIPHENHYDRAMINE PER 50 MG: Performed by: EMERGENCY MEDICINE

## 2022-08-27 PROCEDURE — 25010000002 MAGNESIUM SULFATE 2 GM/50ML SOLUTION: Performed by: EMERGENCY MEDICINE

## 2022-08-27 PROCEDURE — G0378 HOSPITAL OBSERVATION PER HR: HCPCS

## 2022-08-27 PROCEDURE — 96375 TX/PRO/DX INJ NEW DRUG ADDON: CPT

## 2022-08-27 PROCEDURE — 93005 ELECTROCARDIOGRAM TRACING: CPT | Performed by: STUDENT IN AN ORGANIZED HEALTH CARE EDUCATION/TRAINING PROGRAM

## 2022-08-27 PROCEDURE — 25010000002 PROCHLORPERAZINE 10 MG/2ML SOLUTION: Performed by: EMERGENCY MEDICINE

## 2022-08-27 RX ORDER — AMITRIPTYLINE HYDROCHLORIDE 25 MG/1
25 TABLET, FILM COATED ORAL NIGHTLY
Status: DISCONTINUED | OUTPATIENT
Start: 2022-08-27 | End: 2022-08-27 | Stop reason: HOSPADM

## 2022-08-27 RX ORDER — HYDROCODONE BITARTRATE AND ACETAMINOPHEN 5; 325 MG/1; MG/1
1 TABLET ORAL EVERY 4 HOURS PRN
Status: DISCONTINUED | OUTPATIENT
Start: 2022-08-27 | End: 2022-08-27 | Stop reason: HOSPADM

## 2022-08-27 RX ORDER — ACETAMINOPHEN 500 MG
1000 TABLET ORAL ONCE
Status: COMPLETED | OUTPATIENT
Start: 2022-08-27 | End: 2022-08-27

## 2022-08-27 RX ORDER — ONDANSETRON 2 MG/ML
8 INJECTION INTRAMUSCULAR; INTRAVENOUS ONCE
Status: COMPLETED | OUTPATIENT
Start: 2022-08-27 | End: 2022-08-27

## 2022-08-27 RX ORDER — BACLOFEN 10 MG/1
10 TABLET ORAL NIGHTLY
Status: DISCONTINUED | OUTPATIENT
Start: 2022-08-27 | End: 2022-08-27 | Stop reason: HOSPADM

## 2022-08-27 RX ORDER — KETOROLAC TROMETHAMINE 15 MG/ML
15 INJECTION, SOLUTION INTRAMUSCULAR; INTRAVENOUS ONCE
Status: COMPLETED | OUTPATIENT
Start: 2022-08-27 | End: 2022-08-27

## 2022-08-27 RX ORDER — MAGNESIUM SULFATE HEPTAHYDRATE 40 MG/ML
2 INJECTION, SOLUTION INTRAVENOUS ONCE
Status: COMPLETED | OUTPATIENT
Start: 2022-08-27 | End: 2022-08-27

## 2022-08-27 RX ORDER — DIPHENHYDRAMINE HYDROCHLORIDE 50 MG/ML
25 INJECTION INTRAMUSCULAR; INTRAVENOUS ONCE
Status: COMPLETED | OUTPATIENT
Start: 2022-08-27 | End: 2022-08-27

## 2022-08-27 RX ORDER — SUMATRIPTAN 50 MG/1
50 TABLET, FILM COATED ORAL
Status: DISCONTINUED | OUTPATIENT
Start: 2022-08-27 | End: 2022-08-27

## 2022-08-27 RX ORDER — BACLOFEN 10 MG/1
10 TABLET ORAL NIGHTLY PRN
Qty: 30 TABLET | Refills: 0 | Status: SHIPPED | OUTPATIENT
Start: 2022-08-27

## 2022-08-27 RX ORDER — PROCHLORPERAZINE EDISYLATE 5 MG/ML
10 INJECTION INTRAMUSCULAR; INTRAVENOUS ONCE
Status: COMPLETED | OUTPATIENT
Start: 2022-08-27 | End: 2022-08-27

## 2022-08-27 RX ORDER — AMITRIPTYLINE HYDROCHLORIDE 25 MG/1
25 TABLET, FILM COATED ORAL NIGHTLY PRN
Qty: 30 TABLET | Refills: 0 | Status: SHIPPED | OUTPATIENT
Start: 2022-08-27

## 2022-08-27 RX ADMIN — MAGNESIUM SULFATE HEPTAHYDRATE 2 G: 2 INJECTION, SOLUTION INTRAVENOUS at 11:30

## 2022-08-27 RX ADMIN — SODIUM CHLORIDE 125 ML/HR: 9 INJECTION, SOLUTION INTRAVENOUS at 07:07

## 2022-08-27 RX ADMIN — Medication 10 ML: at 10:16

## 2022-08-27 RX ADMIN — Medication 10 ML: at 08:43

## 2022-08-27 RX ADMIN — Medication 10 ML: at 10:11

## 2022-08-27 RX ADMIN — DIPHENHYDRAMINE HYDROCHLORIDE 25 MG: 50 INJECTION, SOLUTION INTRAMUSCULAR; INTRAVENOUS at 10:18

## 2022-08-27 RX ADMIN — BUTALBITAL, ACETAMINOPHEN, AND CAFFEINE 1 TABLET: 50; 325; 40 TABLET ORAL at 01:39

## 2022-08-27 RX ADMIN — AMLODIPINE BESYLATE 10 MG: 10 TABLET ORAL at 08:42

## 2022-08-27 RX ADMIN — SODIUM CHLORIDE 500 ML: 9 INJECTION, SOLUTION INTRAVENOUS at 10:21

## 2022-08-27 RX ADMIN — PROCHLORPERAZINE EDISYLATE 10 MG: 5 INJECTION INTRAMUSCULAR; INTRAVENOUS at 10:11

## 2022-08-27 RX ADMIN — ZOLPIDEM TARTRATE 5 MG: 5 TABLET ORAL at 01:39

## 2022-08-27 RX ADMIN — ONDANSETRON 8 MG: 2 INJECTION INTRAMUSCULAR; INTRAVENOUS at 10:07

## 2022-08-27 RX ADMIN — ACETAMINOPHEN 1000 MG: 500 TABLET ORAL at 10:05

## 2022-08-27 RX ADMIN — SODIUM CHLORIDE 125 ML/HR: 9 INJECTION, SOLUTION INTRAVENOUS at 16:26

## 2022-08-27 RX ADMIN — LOSARTAN POTASSIUM: 50 TABLET, FILM COATED ORAL at 08:42

## 2022-08-27 RX ADMIN — KETOROLAC TROMETHAMINE 15 MG: 15 INJECTION, SOLUTION INTRAMUSCULAR; INTRAVENOUS at 10:14

## 2022-08-27 RX ADMIN — PANTOPRAZOLE SODIUM 40 MG: 40 TABLET, DELAYED RELEASE ORAL at 08:42

## 2022-08-28 ENCOUNTER — READMISSION MANAGEMENT (OUTPATIENT)
Dept: CALL CENTER | Facility: HOSPITAL | Age: 77
End: 2022-08-28

## 2022-08-28 NOTE — OUTREACH NOTE
COVID-19 Week 1 Survey    Flowsheet Row Responses   [facility] patient discharged from? [facility]   Does the patient have one of the following disease processes/diagnoses(primary or secondary)? COVID-19   COVID-19 underlying condition? None   Call Number Call 1   Week 1 Call successful? Yes   Call start time 1017   Call end time 1019   Discharge diagnosis Covid positive   Meds reviewed with patient/caregiver? Yes   Is the patient having any side effects they believe may be caused by any medication additions or changes? No   Does the patient have all medications ordered at discharge? Yes   Is the patient taking all medications as directed (includes completed medication regime)? Yes   Does the patient have a primary care provider?  Yes   Does the patient have an appointment with their PCP or specialist within 7 days of discharge? No   What is preventing the patient from scheduling follow up appointments within 7 days of discharge? Haven't had time   Nursing Interventions Educated patient on importance of making appointment   Has the patient kept scheduled appointments due by today? N/A   Has home health visited the patient within 72 hours of discharge? N/A   Psychosocial issues? No   Did the patient receive a copy of their discharge instructions? Yes   Did the patient receive a copy of COVID-19 specific instructions? Yes   Nursing interventions Reviewed instructions with patient   What is the patient's perception of their health status since discharge? Improving   Does the patient have any of the following symptoms? Cough   Nursing Interventions Nurse provided patient education   Pulse Ox monitoring None   Is the patient/caregiver able to teach back steps to recovery at home? Set small, achievable goals for return to baseline health, Rest and rebuild strength, gradually increase activity, Eat a well-balance diet   Is the patient/caregiver able to teach back the hierarchy of who to call/visit for  symptoms/problems? PCP, Specialist, Home health nurse, Urgent Care, ED, 911 Yes   COVID-19 call completed? Yes          LACHELLE Tyson Registered Nurse

## 2022-08-28 NOTE — OUTREACH NOTE
Prep Survey    Flowsheet Row Responses   Gnosticism facility patient discharged from? [place]   Is LACE score < 7 ? Yes   Emergency Room discharge w/ pulse ox? No   Eligibility Readm Mgmt   Discharge diagnosis Covid positive   Does the patient have one of the following disease processes/diagnoses(primary or secondary)? COVID-19   Does the patient have Home health ordered? No   Is there a DME ordered? No   Prep survey completed? Yes          LANIE KENT - Registered Nurse

## 2022-08-29 LAB — QT INTERVAL: 389 MS

## 2022-08-30 ENCOUNTER — READMISSION MANAGEMENT (OUTPATIENT)
Dept: CALL CENTER | Facility: HOSPITAL | Age: 77
End: 2022-08-30

## 2022-08-30 NOTE — OUTREACH NOTE
COVID-19 Week 1 Survey    Flowsheet Row Responses   [facility] patient discharged from? [place]   Does the patient have one of the following disease processes/diagnoses(primary or secondary)? COVID-19   COVID-19 underlying condition? None   Call Number Call 2   Week 1 Call successful? Yes   Call start time 1152   Call end time 1155   Discharge diagnosis Covid positive   Meds reviewed with patient/caregiver? Yes   Is the patient having any side effects they believe may be caused by any medication additions or changes? No   Does the patient have all medications ordered at discharge? Yes   Is the patient taking all medications as directed (includes completed medication regime)? Yes   Does the patient have a primary care provider?  Yes   Does the patient have an appointment with their PCP or specialist within 7 days of discharge? No   What is preventing the patient from scheduling follow up appointments within 7 days of discharge? Haven't had time   Nursing Interventions Advised patient to make appointment   Has the patient kept scheduled appointments due by today? N/A   Has home health visited the patient within 72 hours of discharge? N/A   Psychosocial issues? No   What is the patient's perception of their health status since discharge? Improving   Does the patient have any of the following symptoms? Cough   Nursing Interventions Nurse provided patient education   Is the patient/caregiver able to teach back steps to recovery at home? Rest and rebuild strength, gradually increase activity, Eat a well-balance diet   COVID-19 call completed? Yes          MANNY JENKINS - Registered Nurse

## 2022-09-06 ENCOUNTER — READMISSION MANAGEMENT (OUTPATIENT)
Dept: CALL CENTER | Facility: HOSPITAL | Age: 77
End: 2022-09-06

## 2022-09-06 NOTE — OUTREACH NOTE
COVID-19 Week 2 Survey    Flowsheet Row Responses   [facility] patient discharged from? [place]   Does the patient have one of the following disease processes/diagnoses(primary or secondary)? COVID-19   COVID-19 underlying condition? None   Call Number Call 1   COVID-19 Week 2: Call 1 attempt successful? Yes   Call start time 1110   Call end time 1112   Discharge diagnosis Covid positive   Person spoke with today (if not patient) and relationship patient   Meds reviewed with patient/caregiver? Yes   Is the patient having any side effects they believe may be caused by any medication additions or changes? No   Does the patient have all medications ordered at discharge? Yes   Is the patient taking all medications as directed (includes completed medication regime)? Yes   Does the patient have a primary care provider?  Yes   Comments regarding PCP has appt next week   Nursing Interventions Verified appointment date/time/provider   Has the patient kept scheduled appointments due by today? N/A   Psychosocial issues? No   What is the patient's perception of their health status since discharge? Improving   Does the patient have any of the following symptoms? Cough   Nursing Interventions Nurse provided patient education   Pulse Ox monitoring None   Is the patient/caregiver able to teach back steps to recovery at home? Rest and rebuild strength, gradually increase activity, Eat a well-balance diet   If the patient is a current smoker, are they able to teach back resources for cessation? Not a smoker   Is the patient/caregiver able to teach back the hierarchy of who to call/visit for symptoms/problems? PCP, Specialist, Home health nurse, Urgent Care, ED, 911 Yes   COVID-19 call completed? Yes   Wrap up additional comments Pt states she still has a cough. Pt advised to call PCP office for rx r/t cough. Pt shares she has PCP fu appt next week. Questions/concerns addressed.          OSCAR JENKINS - Registered Nurse

## 2022-09-13 ENCOUNTER — READMISSION MANAGEMENT (OUTPATIENT)
Dept: CALL CENTER | Facility: HOSPITAL | Age: 77
End: 2022-09-13

## 2022-09-13 NOTE — OUTREACH NOTE
COVID-19 Week 3 Survey    Flowsheet Row Responses   [facility] patient discharged from? [place]   Does the patient have one of the following disease processes/diagnoses(primary or secondary)? COVID-19   COVID-19 underlying condition? None   Call Number Call 1   COVID-19 Week 3: Call 1 attempt successful? Yes   Call start time 1217   Call end time 1222   Discharge diagnosis Covid positive   Meds reviewed with patient/caregiver? Yes   Is the patient having any side effects they believe may be caused by any medication additions or changes? No   Does the patient have all medications ordered at discharge? Yes   Is the patient taking all medications as directed (includes completed medication regime)? Yes   Does the patient have a primary care provider?  Yes   Does the patient have an appointment with their PCP or specialist within 7 days of discharge? Yes   Has the patient kept scheduled appointments due by today? No  [had to reschedule due to family emergency]   Has home health visited the patient within 72 hours of discharge? N/A   Psychosocial issues? Yes   Psychosocial comments pt states granddaughter has passed away, is leaving for [place] tomorrow for    Did the patient receive a copy of their discharge instructions? Yes   Did the patient receive a copy of COVID-19 specific instructions? Yes   Nursing interventions Reviewed instructions with patient   What is the patient's perception of their health status since discharge? Improving   Does the patient have any of the following symptoms? None   Nursing Interventions Nurse provided patient education   Pulse Ox monitoring None   Is the patient/caregiver able to teach back steps to recovery at home? Set small, achievable goals for return to baseline health, Rest and rebuild strength, gradually increase activity, Eat a well-balance diet   Is the patient/caregiver able to teach back the hierarchy of who to call/visit for symptoms/problems? PCP,  Specialist, Home health nurse, Urgent Care, ED, 911 Yes  [H/A's have resolved]   COVID-19 call completed? Yes   Revoked No further contact(revokes)-requires comment   Graduated/Revoked comments pt is free of COVID symptoms and underlying H/A's. No more calls necessary          JOHN JENKINS - Registered Nurse

## 2022-12-19 ENCOUNTER — TRANSCRIBE ORDERS (OUTPATIENT)
Dept: ADMINISTRATIVE | Facility: HOSPITAL | Age: 77
End: 2022-12-19

## 2022-12-19 DIAGNOSIS — R07.9 CHEST PAIN, UNSPECIFIED TYPE: Primary | ICD-10-CM

## 2022-12-30 ENCOUNTER — HOSPITAL ENCOUNTER (OUTPATIENT)
Dept: NUCLEAR MEDICINE | Facility: HOSPITAL | Age: 77
Discharge: HOME | End: 2022-12-30

## 2022-12-30 DIAGNOSIS — R07.9 CHEST PAIN, UNSPECIFIED TYPE: ICD-10-CM

## 2022-12-30 LAB
BH CV REST NUCLEAR ISOTOPE DOSE: 9.8 MCI
BH CV STRESS BP STAGE 1: NORMAL
BH CV STRESS COMMENTS STAGE 1: NORMAL
BH CV STRESS DOSE REGADENOSON STAGE 1: 0.4
BH CV STRESS DURATION MIN STAGE 1: 3
BH CV STRESS DURATION SEC STAGE 1: 59
BH CV STRESS HR STAGE 1: 98
BH CV STRESS NUCLEAR ISOTOPE DOSE: 30.1 MCI
BH CV STRESS PROTOCOL 1: NORMAL
BH CV STRESS RECOVERY BP: NORMAL MMHG
BH CV STRESS RECOVERY HR: 104 BPM
BH CV STRESS STAGE 1: 1
LV EF NUC BP: 91 %
MAXIMAL PREDICTED HEART RATE: 142 BPM
PERCENT MAX PREDICTED HR: 80.28 %
STRESS BASELINE BP: NORMAL MMHG
STRESS BASELINE HR: 87 BPM
STRESS PERCENT HR: 94 %
STRESS POST PEAK BP: NORMAL MMHG
STRESS POST PEAK HR: 114 BPM
STRESS TARGET HR: 121 BPM

## 2022-12-30 PROCEDURE — 93018 CV STRESS TEST I&R ONLY: CPT | Performed by: INTERNAL MEDICINE

## 2022-12-30 PROCEDURE — 93017 CV STRESS TEST TRACING ONLY: CPT

## 2022-12-30 PROCEDURE — 25010000002 REGADENOSON 0.4 MG/5ML SOLUTION: Performed by: INTERNAL MEDICINE

## 2022-12-30 PROCEDURE — 0 TECHNETIUM SESTAMIBI: Performed by: INTERNAL MEDICINE

## 2022-12-30 PROCEDURE — 78452 HT MUSCLE IMAGE SPECT MULT: CPT

## 2022-12-30 PROCEDURE — 78452 HT MUSCLE IMAGE SPECT MULT: CPT | Performed by: INTERNAL MEDICINE

## 2022-12-30 PROCEDURE — A9500 TC99M SESTAMIBI: HCPCS | Performed by: INTERNAL MEDICINE

## 2022-12-30 PROCEDURE — 93016 CV STRESS TEST SUPVJ ONLY: CPT | Performed by: INTERNAL MEDICINE

## 2022-12-30 RX ADMIN — REGADENOSON 0.4 MG: 0.08 INJECTION, SOLUTION INTRAVENOUS at 08:40

## 2022-12-30 RX ADMIN — TECHNETIUM TC 99M SESTAMIBI 1 DOSE: 1 INJECTION INTRAVENOUS at 07:17

## 2022-12-30 RX ADMIN — TECHNETIUM TC 99M SESTAMIBI 1 DOSE: 1 INJECTION INTRAVENOUS at 08:40

## 2023-10-16 ENCOUNTER — OFFICE VISIT (OUTPATIENT)
Dept: NEUROLOGY | Facility: CLINIC | Age: 78
End: 2023-10-16
Payer: MEDICARE

## 2023-10-16 DIAGNOSIS — R20.0 NUMBNESS AND TINGLING IN BOTH HANDS: ICD-10-CM

## 2023-10-16 DIAGNOSIS — G60.9 PERIPHERAL NEUROPATHY, IDIOPATHIC: ICD-10-CM

## 2023-10-16 DIAGNOSIS — R20.2 NUMBNESS AND TINGLING IN BOTH HANDS: ICD-10-CM

## 2023-10-16 DIAGNOSIS — R26.89 IMPAIRMENT OF BALANCE: ICD-10-CM

## 2023-10-16 DIAGNOSIS — M21.371 RIGHT FOOT DROP: Primary | ICD-10-CM

## 2023-10-16 DIAGNOSIS — M21.372 FOOT DROP, LEFT: ICD-10-CM

## 2023-10-16 PROCEDURE — 1159F MED LIST DOCD IN RCRD: CPT | Performed by: PSYCHIATRY & NEUROLOGY

## 2023-10-16 PROCEDURE — 99214 OFFICE O/P EST MOD 30 MIN: CPT | Performed by: PSYCHIATRY & NEUROLOGY

## 2023-10-16 PROCEDURE — 1160F RVW MEDS BY RX/DR IN RCRD: CPT | Performed by: PSYCHIATRY & NEUROLOGY

## 2023-10-16 RX ORDER — ASPIRIN 81 MG/1
TABLET ORAL DAILY
COMMUNITY

## 2023-10-16 NOTE — LETTER
October 16, 2023       No Recipients    Patient: Leandra Wesley   YOB: 1945   Date of Visit: 10/16/2023     Dear Soila Young MD:       Thank you for referring Leandra Wesley to me for evaluation. Below are the relevant portions of my assessment and plan of care.    If you have questions, please do not hesitate to call me. I look forward to following Leandra along with you.         Sincerely,        Pablito Suero II, MD        CC:   No Recipients    Pablito Suero II, MD  10/16/23 1508  Sign when Signing Visit  Chief complaint: Foot drop      Patient ID: Leandra Wesley is a 77 y.o. female.    HPI: I have had the pleasure of seeing your patient today.  As you may know she is a 77-year-old female here for history of left foot drop.  The patient states that a couple of years ago she started to have foot drop on the left.  She states that she noted also some numbness and tingling in the feet.  She says that it has progressed to include complete foot drop on the left and more numbness of the feet.  Over the last several months though she has noted foot drop on the right.  It is not a complete drop.  She still does have some muscle strength in that right lower extremity.  However she says that she is afraid that it is going to end up like her left foot.  She has not had any major falls due to the foot drop.  No bowel or bladder changes.  She does mention a history of chronic lower back issues and has been told that she has degenerative disc disease.  She has had cervical surgical intervention due to the same.  She has noted some numbness in her hands as well as difficulty with fine motor movement.  She says picking up things is somewhat difficult for her.  Using certain utensils can also be difficult.  No double vision or loss of vision.  No slurred speech or trouble getting words out.  No trouble swallowing.  She has no history of diabetes.  She did recently have an MRI of the lumbosacral spine.  We are  trying to retrieve a copy of that now.    The following portions of the patient's history were reviewed and updated as appropriate: allergies, current medications, past family history, past medical history, past social history, past surgical history and problem list.    Review of Systems   Constitutional:  Positive for fatigue. Negative for unexpected weight change.   HENT:  Negative for ear pain, hearing loss, nosebleeds and tinnitus.    Eyes:  Negative for photophobia, pain and visual disturbance.   Respiratory:  Negative for chest tightness, shortness of breath and wheezing.    Cardiovascular:  Negative for chest pain and palpitations.   Musculoskeletal:  Positive for gait problem (>2 falls in last year; walker) and myalgias (arms).   Neurological:  Positive for numbness (left foot, drop). Negative for dizziness, speech difficulty, weakness, light-headedness and headaches.   Psychiatric/Behavioral:  Negative for confusion, decreased concentration and sleep disturbance. The patient is not nervous/anxious.       I have reviewed the review of systems above performed by my medical assistant.      There were no vitals filed for this visit.    Neurologic Exam     Mental Status   Oriented to person, place, and time.   Registration: recalls 3 of 3 objects. Follows 3 step commands.   Attention: normal. Concentration: normal.   Speech: speech is normal   Level of consciousness: alert  Knowledge: consistent with education (No deficits found.).   Normal comprehension.     Cranial Nerves     CN II   Visual fields full to confrontation.     CN III, IV, VI   Pupils are equal, round, and reactive to light.  Extraocular motions are normal.   CN III: no CN III palsy  CN VI: no CN VI palsy  Nystagmus: none   Diplopia: none    CN V   Facial sensation intact.     CN VII   Facial expression full, symmetric.     CN VIII   CN VIII normal.     CN IX, X   CN IX normal.   CN X normal.     CN XI   CN XI normal.     CN XII   CN XII normal.      Motor Exam   Muscle bulk: normal  Right arm tone: normal  Left arm tone: normal  Right leg tone: normal  Left leg tone: normal    Strength   Right neck flexion: 5/5  Left neck flexion: 5/5  Right neck extension: 5/5  Left neck extension: 5/5  Right deltoid: 5/5  Left deltoid: 5/5  Right biceps: 5/5  Left biceps: 5/5  Right triceps: 5/5  Left triceps: 5/5  Right wrist flexion: 5/5  Left wrist flexion: 5/5  Right wrist extension: 5/5  Left wrist extension: 5/5  Right interossei: 5/5  Left interossei: 5/5  Right abdominals: 5/5  Left abdominals: 5/5  Right iliopsoas: 5/5  Left iliopsoas: 5/5  Right quadriceps: 5/5  Left quadriceps: 5/5  Right hamstrin/5  Left hamstrin/5  Right glutei: 5/5  Left glutei: 5/5  Right anterior tibial: 5/5  Left anterior tibial: 5/5  Right posterior tibial: 5/5  Left posterior tibial: 5/5  Right peroneal: 5/5  Left peroneal: 5/5  Right gastroc: 5/5  Left gastroc: 5/5    Sensory Exam   Light touch normal.   Right leg vibration: decreased from toes  Left leg vibration: decreased from toes  Proprioception normal.   Pinprick normal.     Gait, Coordination, and Reflexes     Gait  Gait: wide-based    Coordination   Romberg: negative    Tremor   Resting tremor: absent  Intention tremor: absent    Reflexes   Right brachioradialis: 2+  Left brachioradialis: 2+  Right biceps: 2+  Left biceps: 2+  Right triceps: 2+  Left triceps: 2+  Right patellar: 1+  Left patellar: 1+  Right achilles: 0  Left achilles: 0  Right : 2+  Left : 2+Station is normal.       Physical Exam  Vitals reviewed.   Constitutional:       General: She is not in acute distress.     Appearance: She is well-developed.   HENT:      Head: Normocephalic and atraumatic.   Eyes:      Extraocular Movements: EOM normal.      Pupils: Pupils are equal, round, and reactive to light.   Cardiovascular:      Rate and Rhythm: Normal rate and regular rhythm.      Heart sounds: Normal heart sounds.   Pulmonary:      Effort:  Pulmonary effort is normal. No respiratory distress.      Breath sounds: Normal breath sounds.   Abdominal:      General: Bowel sounds are normal. There is no distension.      Palpations: Abdomen is soft.      Tenderness: There is no abdominal tenderness.   Musculoskeletal:         General: No deformity.      Cervical back: Normal range of motion.   Skin:     General: Skin is warm.      Findings: No rash.   Neurological:      Mental Status: She is oriented to person, place, and time.      Coordination: Romberg Test normal.      Deep Tendon Reflexes:      Reflex Scores:       Tricep reflexes are 2+ on the right side and 2+ on the left side.       Bicep reflexes are 2+ on the right side and 2+ on the left side.       Brachioradialis reflexes are 2+ on the right side and 2+ on the left side.       Patellar reflexes are 1+ on the right side and 1+ on the left side.       Achilles reflexes are 0 on the right side and 0 on the left side.  Psychiatric:         Speech: Speech normal.         Judgment: Judgment normal.         Procedures    Assessment/Plan: I would like to schedule an EMG/nerve conduction study of both lower and upper extremities.  I feel that she has carpal tunnel syndrome in her hands causing some  strength issues as well as numbness.  However I fear that there is likely nerve issues related to lumbosacral radiculopathy and peripheral neuropathy in both lower extremities.  I would like to give her a prescription for an AFO for the right lower extremity.  She does have 1 for the left.  I would advise her to continue using a walker to assist with ambulation.  I also would recommend physical therapy.  We will prescribe that as well.  Return to clinic after EMG.       Diagnoses and all orders for this visit:    1. Right foot drop (Primary)  -      Static or Dynamic Ankle Foot Orthosis  -     EMG & Nerve Conduction Test; Future  -     Ambulatory Referral to Physical Therapy    2. Foot drop, left  -      EMG & Nerve Conduction Test; Future  -     Ambulatory Referral to Physical Therapy    3. Numbness and tingling in both hands  -     EMG & Nerve Conduction Test; Future    4. Impairment of balance  -     Ambulatory Referral to Physical Therapy    5. Peripheral neuropathy, idiopathic  -     EMG & Nerve Conduction Test; Future  -     Ambulatory Referral to Physical Therapy           Pablito Suero II, MD

## 2023-10-16 NOTE — PROGRESS NOTES
Chief complaint: Foot drop      Patient ID: Leandra Wesley is a 77 y.o. female.    HPI: I have had the pleasure of seeing your patient today.  As you may know she is a 77-year-old female here for history of left foot drop.  The patient states that a couple of years ago she started to have foot drop on the left.  She states that she noted also some numbness and tingling in the feet.  She says that it has progressed to include complete foot drop on the left and more numbness of the feet.  Over the last several months though she has noted foot drop on the right.  It is not a complete drop.  She still does have some muscle strength in that right lower extremity.  However she says that she is afraid that it is going to end up like her left foot.  She has not had any major falls due to the foot drop.  No bowel or bladder changes.  She does mention a history of chronic lower back issues and has been told that she has degenerative disc disease.  She has had cervical surgical intervention due to the same.  She has noted some numbness in her hands as well as difficulty with fine motor movement.  She says picking up things is somewhat difficult for her.  Using certain utensils can also be difficult.  No double vision or loss of vision.  No slurred speech or trouble getting words out.  No trouble swallowing.  She has no history of diabetes.  She did recently have an MRI of the lumbosacral spine.  We are trying to retrieve a copy of that now.    The following portions of the patient's history were reviewed and updated as appropriate: allergies, current medications, past family history, past medical history, past social history, past surgical history and problem list.    Review of Systems   Constitutional:  Positive for fatigue. Negative for unexpected weight change.   HENT:  Negative for ear pain, hearing loss, nosebleeds and tinnitus.    Eyes:  Negative for photophobia, pain and visual disturbance.   Respiratory:  Negative for  chest tightness, shortness of breath and wheezing.    Cardiovascular:  Negative for chest pain and palpitations.   Musculoskeletal:  Positive for gait problem (>2 falls in last year; walker) and myalgias (arms).   Neurological:  Positive for numbness (left foot, drop). Negative for dizziness, speech difficulty, weakness, light-headedness and headaches.   Psychiatric/Behavioral:  Negative for confusion, decreased concentration and sleep disturbance. The patient is not nervous/anxious.       I have reviewed the review of systems above performed by my medical assistant.      There were no vitals filed for this visit.    Neurologic Exam     Mental Status   Oriented to person, place, and time.   Registration: recalls 3 of 3 objects. Follows 3 step commands.   Attention: normal. Concentration: normal.   Speech: speech is normal   Level of consciousness: alert  Knowledge: consistent with education (No deficits found.).   Normal comprehension.     Cranial Nerves     CN II   Visual fields full to confrontation.     CN III, IV, VI   Pupils are equal, round, and reactive to light.  Extraocular motions are normal.   CN III: no CN III palsy  CN VI: no CN VI palsy  Nystagmus: none   Diplopia: none    CN V   Facial sensation intact.     CN VII   Facial expression full, symmetric.     CN VIII   CN VIII normal.     CN IX, X   CN IX normal.   CN X normal.     CN XI   CN XI normal.     CN XII   CN XII normal.     Motor Exam   Muscle bulk: normal  Right arm tone: normal  Left arm tone: normal  Right leg tone: normal  Left leg tone: normal    Strength   Right neck flexion: 5/5  Left neck flexion: 5/5  Right neck extension: 5/5  Left neck extension: 5/5  Right deltoid: 5/5  Left deltoid: 5/5  Right biceps: 5/5  Left biceps: 5/5  Right triceps: 5/5  Left triceps: 5/5  Right wrist flexion: 5/5  Left wrist flexion: 5/5  Right wrist extension: 5/5  Left wrist extension: 5/5  Right interossei: 5/5  Left interossei: 5/5  Right abdominals:  5/5  Left abdominals: 5/5  Right iliopsoas: 5/5  Left iliopsoas: 5/5  Right quadriceps: 5/5  Left quadriceps: 5/5  Right hamstrin/5  Left hamstrin/5  Right glutei: 5/5  Left glutei: 5/5  Right anterior tibial: 5/5  Left anterior tibial: 5/5  Right posterior tibial: 5/5  Left posterior tibial: 5/5  Right peroneal: 5/5  Left peroneal: 5/5  Right gastroc: 5/5  Left gastroc: 5/5    Sensory Exam   Light touch normal.   Right leg vibration: decreased from toes  Left leg vibration: decreased from toes  Proprioception normal.   Pinprick normal.     Gait, Coordination, and Reflexes     Gait  Gait: wide-based    Coordination   Romberg: negative    Tremor   Resting tremor: absent  Intention tremor: absent    Reflexes   Right brachioradialis: 2+  Left brachioradialis: 2+  Right biceps: 2+  Left biceps: 2+  Right triceps: 2+  Left triceps: 2+  Right patellar: 1+  Left patellar: 1+  Right achilles: 0  Left achilles: 0  Right : 2+  Left : 2+Station is normal.       Physical Exam  Vitals reviewed.   Constitutional:       General: She is not in acute distress.     Appearance: She is well-developed.   HENT:      Head: Normocephalic and atraumatic.   Eyes:      Extraocular Movements: EOM normal.      Pupils: Pupils are equal, round, and reactive to light.   Cardiovascular:      Rate and Rhythm: Normal rate and regular rhythm.      Heart sounds: Normal heart sounds.   Pulmonary:      Effort: Pulmonary effort is normal. No respiratory distress.      Breath sounds: Normal breath sounds.   Abdominal:      General: Bowel sounds are normal. There is no distension.      Palpations: Abdomen is soft.      Tenderness: There is no abdominal tenderness.   Musculoskeletal:         General: No deformity.      Cervical back: Normal range of motion.   Skin:     General: Skin is warm.      Findings: No rash.   Neurological:      Mental Status: She is oriented to person, place, and time.      Coordination: Romberg Test normal.      Deep  Tendon Reflexes:      Reflex Scores:       Tricep reflexes are 2+ on the right side and 2+ on the left side.       Bicep reflexes are 2+ on the right side and 2+ on the left side.       Brachioradialis reflexes are 2+ on the right side and 2+ on the left side.       Patellar reflexes are 1+ on the right side and 1+ on the left side.       Achilles reflexes are 0 on the right side and 0 on the left side.  Psychiatric:         Speech: Speech normal.         Judgment: Judgment normal.         Procedures    Assessment/Plan: I would like to schedule an EMG/nerve conduction study of both lower and upper extremities.  I feel that she has carpal tunnel syndrome in her hands causing some  strength issues as well as numbness.  However I fear that there is likely nerve issues related to lumbosacral radiculopathy and peripheral neuropathy in both lower extremities.  I would like to give her a prescription for an AFO for the right lower extremity.  She does have 1 for the left.  I would advise her to continue using a walker to assist with ambulation.  I also would recommend physical therapy.  We will prescribe that as well.  Return to clinic after EMG.       Diagnoses and all orders for this visit:    1. Right foot drop (Primary)  -      Static or Dynamic Ankle Foot Orthosis  -     EMG & Nerve Conduction Test; Future  -     Ambulatory Referral to Physical Therapy    2. Foot drop, left  -     EMG & Nerve Conduction Test; Future  -     Ambulatory Referral to Physical Therapy    3. Numbness and tingling in both hands  -     EMG & Nerve Conduction Test; Future    4. Impairment of balance  -     Ambulatory Referral to Physical Therapy    5. Peripheral neuropathy, idiopathic  -     EMG & Nerve Conduction Test; Future  -     Ambulatory Referral to Physical Therapy           Pablito Suero II, MD

## 2023-10-27 ENCOUNTER — PATIENT ROUNDING (BHMG ONLY) (OUTPATIENT)
Dept: NEUROLOGY | Facility: CLINIC | Age: 78
End: 2023-10-27
Payer: MEDICARE

## 2023-11-10 ENCOUNTER — HOSPITAL ENCOUNTER (OUTPATIENT)
Dept: PHYSICAL THERAPY | Facility: HOSPITAL | Age: 78
Setting detail: THERAPIES SERIES
Discharge: HOME | End: 2023-11-10
Payer: MEDICARE

## 2023-11-10 DIAGNOSIS — M21.371 BILATERAL FOOT-DROP: ICD-10-CM

## 2023-11-10 DIAGNOSIS — Z86.73 HISTORY OF CVA (CEREBROVASCULAR ACCIDENT): Primary | ICD-10-CM

## 2023-11-10 DIAGNOSIS — M21.372 BILATERAL FOOT-DROP: ICD-10-CM

## 2023-11-10 DIAGNOSIS — M48.02 CERVICAL STENOSIS OF SPINAL CANAL: ICD-10-CM

## 2023-11-10 DIAGNOSIS — G60.9 PERIPHERAL NEUROPATHY, IDIOPATHIC: ICD-10-CM

## 2023-11-10 PROCEDURE — 97162 PT EVAL MOD COMPLEX 30 MIN: CPT

## 2023-11-10 NOTE — THERAPY EVALUATION
"  .Outpatient Physical Therapy Neuro Initial Evaluation  [facility]     Patient Name: Leandra Wesley  : 1945  MRN: [id number]  Today's Date: 11/10/2023      Visit Date: 11/10/2023    Patient Active Problem List   Diagnosis    Cervical stenosis of spinal canal    History of CVA (cerebrovascular accident)    Hypertension    GERD (gastroesophageal reflux disease)    Carotid stenosis    CKD (chronic kidney disease) stage 3, GFR 30-59 ml/min    Headache, common migraine, intractable    COVID-19 virus detected        Past Medical History:   Diagnosis Date    Carotid stenosis     STATES 60 PERCENT BOTH SIDES. FOLLOWED YEARLY DR OCASIO    Cervical disc disorder     Chronic back pain     DDD (degenerative disc disease), cervical     Diverticular disease     Foot drop, left foot     WEARS BRACE LEFT LEG    GERD (gastroesophageal reflux disease)     History of anemia     History of kidney stones     History of migraine     Hypertension     IBS (irritable bowel syndrome)     Intestine disorder     intestines twisted, did not require surgical intervention, \"it straightened itself out\"    Acevedo's neuroma of right foot     had surgery    Seasonal allergies     Stroke     \"i re walk to my right\" ; left sided weakness        Past Surgical History:   Procedure Laterality Date    ANTERIOR CERVICAL DISCECTOMY W/ FUSION N/A 10/13/2021    Procedure: ANTERIOR CERVICAL DISCECTOMY AND FUSION C4 -C7 WITH APPLICATION OF ALLOGRAFT BONE;  Surgeon: Timmy Gaytan MD;  Location: [facility] MAIN OR;  Service: Orthopedic Spine;  Laterality: N/A;    BLADDER SURGERY      CATARACT EXTRACTION W/ INTRAOCULAR LENS IMPLANT      LEFT AND RIGHT    CHOLECYSTECTOMY      COLONOSCOPY      FOOT NEUROMA SURGERY Right     4TH TOE    HYSTERECTOMY      TONSILLECTOMY           Visit Dx:     ICD-10-CM ICD-9-CM   1. History of CVA (cerebrovascular accident)  Z86.73 V12.54   2. Cervical stenosis of spinal canal  M48.02 723.0   3. Bilateral foot-drop  M21.371 " 736.79    M21.372    4. Peripheral neuropathy, idiopathic  G60.9 356.9        Patient History       Row Name 11/09/23 1809             History    Chief Complaint Balance Problems;Difficulty Walking;Difficulty with daily activities;Falls/history of falls;Fatigue/poor endurance;Muscle weakness;Numbness;Pain;Swelling;Tightness;Tinglings (P)    -patient      Type of Pain Back pain;Elbow pain;Foot pain;Hand pain;Lower Extremity / Leg;Neck pain;Shoulder pain (P)    -patient      Date Current Problem(s) Began 11/01/21 (P)    -patient      Brief Description of Current Complaint Drop toe (P)    -patient      Patient/Caregiver Goals Improve mobility;Improve strength (P)    -patient      Hand Dominance ambidextrous (P)    -patient      Occupation/sports/leisure activities none now (P)    -patient      Patient seeing anyone else for problem(s)? I have. Dr Fernández , Dr Shayan Santo, Dr Robson Costello (P)    -patient      What clinical tests have you had for this problem? X-ray;MRI;Nerve Conduction Test (P)    -patient      Are you or can you be pregnant No (P)    -patient         Fall Risk Assessment    Any falls in the past year: Yes (P)    -patient      Factors that contributed to the fall: Lost balance;Fatigue (P)    -patient         Services    Prior Rehab/Home Health Experiences Yes (P)    -patient      Are you currently receiving Home Health services No (P)    -patient      Do you plan to receive Home Health services in the near future No (P)    -patient         Daily Activities    Primary Language English (P)    -patient      Are you able to read Yes (P)    -patient      Are you able to write Yes (P)    -patient      How does patient learn best? Listening;Reading;Demonstration (P)    -patient         Safety    Are you being hurt, hit, or frightened by anyone at home or in your life? No (P)    -patient      Are you being neglected by a caregiver No (P)    -patient      Have you had any of the following issues with  Depression;Anxiety (P)    -patient                User Key  (r) = Recorded By, (t) = Taken By, (c) = Cosigned By      Initials Name Provider Type    patient Leandra Wesley --                         PT Neuro       Row Name 11/10/23 1200             Subjective    Subjective Comments My legs are progressively getting weaker I have foot drop  -         Precautions and Contraindications    Precautions/Limitations fall precautions  -      Precautions Bilateral posterior leaf AFOs  -         Subjective Pain    Able to rate subjective pain? yes  -      Pre-Treatment Pain Level 0  -      Post-Treatment Pain Level 0  -         Home Living    Current Living Arrangements home  -      Home Accessibility stairs to enter home;stairs within home  -      Stair Railings, Main Entrance --  2 steps enter 1 handrail  -      Stairs Comment, Within Home, Primary Patient uses stair lift up stairs to b/b  -      Home Equipment Walker;Rollator  -         Cognition    Overall Cognitive Status WFL  -      Arousal/Alertness Appropriate responses to stimuli  -         Sensation    Light Touch No apparent deficits  -         Proprioception    Proprioception Mild deficits bilateral great toe  -         Posture/Observations    Alignment Options Forward head;Rounded shoulders  -      Posture/Observations Comments Spouse brought patient to clinic in transport chair from hospital  -         General ROM    GENERAL ROM COMMENTS Bilateral lower extremity active range of motion impaired due to weakness.  Passive range of motion able to get john ankles to neutral  -         MMT (Manual Muscle Testing)    Rt Lower Ext Rt Hip Flexion;Rt Hip ABduction;Rt Knee Extension;Rt Knee Flexion;Rt Ankle Plantarflexion;Rt Ankle Dorsiflexion  -      Lt Lower Ext Lt Hip Flexion;Lt Hip ABduction;Lt Knee Extension;Lt Knee Flexion;Lt Ankle Dorsiflexion;Lt Ankle Plantarflexion  -         MMT Right Lower Ext    Rt Hip Flexion MMT Gross  Movement (3+/5) fair plus  -LH      Rt Hip ABduction MMT, Gross Movement (3/5) fair  -LH      Rt Knee Extension MMT, Gross Movement (4/5) good  -LH      Rt Knee Flexion MMT, Gross Movement (4/5) good  -LH      Rt Ankle Plantarflexion MMT, Gross Movement (2+/5) poor plus  -LH      Rt Ankle Dorsiflexion MMT, Gross Movement (1/5) trace  -LH         MMT Left Lower Ext    Lt Hip Flexion MMT, Gross Movement (2+/5) poor plus  -LH      Lt Hip ABduction MMT, Gross Movement (2+/5) poor plus  -LH      Lt Knee Extension MMT, Gross Movement (3+/5) fair plus  -LH      Lt Knee Flexion MMT, Gross Movement (2/5) poor  -LH      Lt Ankle Plantarflexion MMT, Gross Movement (1/5) trace  -LH      Lt Ankle Dorsiflexion MMT, Gross Movement (0/5) zero  -LH         Bed Mobility    Bed Mobility supine-sit;sit-supine;sidelying-sit;sit-sidelying;rolling right  -LH      Rolling Right [place] (Bed Mobility) contact guard  -LH      Supine-Sit [place] (Bed Mobility) contact guard  -LH      Sit-Supine [place] (Bed Mobility) contact guard  -LH      Sidelying-Sit [place] (Bed Mobility) contact guard  -LH      Sit-Sidelying [place] (Bed Mobility) contact guard  -      Comment, (Bed Mobility) tx mat, encouraged right lower extremity hook technique to assist left lower extremity.  Verbal cues for logrolling momentum to roll  -         Transfers    Sit-Stand [place] (Transfers) contact guard;verbal cues;nonverbal cues (demo/gesture)  -      Stand-Sit [place] (Transfers) contact guard;verbal cues;nonverbal cues (demo/gesture)  -      Transfers, Sit-Stand-Sit, Assist Device rolling walker  -      Transfer, Safety Issues balance decreased during turns;step length decreased;weight-shifting ability decreased  -      Transfer, Impairments ROM decreased;strength decreased;impaired balance;sensory feedback impaired  -      Comment, (Transfers) Poor eccentric control with standing to sitting patient does not reach  back with upper extremities verbal cues for increased safety and technique  -         Gait/Stairs (Locomotion)    [place] Level (Gait) contact guard;set up;verbal cues;nonverbal cues (demo/gesture)  -      Assistive Device (Gait) walker, front-wheeled  AFOs  -      Distance in Feet (Gait) 10x2  -      Pattern (Gait) step-through;step-to  -LH      Deviations/Abnormal Patterns (Gait) bilateral deviations;weight shifting decreased;stride length decreased;base of support, wide;mariana decreased  -      Bilateral Gait Deviations foot drop/toe drag  -      Left Sided Gait Deviations knee hyperextension  -      Comment, (Gait/Stairs) Ambulation for TUG  -         Balance Skills Training    Balance Comments See outcomes measures  -                User Key  (r) = Recorded By, (t) = Taken By, (c) = Cosigned By      Initials Name Provider Type     Carmel Davidson, PT Physical Therapist                            Therapy Education  Education Details: Encouraged patient and spouse to purchase transport chair for ease of mobility for doctors appointments and PT due to falls risk.  Educated patient that PT will assist with  referral for improved AFOs.  Discussed fall prevention and fall recovery strategies.  Given: Other (comment), Fall prevention and home safety  Program: New  How Provided: Verbal  Provided to: Patient, Caregiver  Level of Understanding: Verbalized     PT OP Goals       Row Name 11/10/23 1215 11/10/23 1200       PT Short Term Goals    STG Date to Achieve -- 12/08/23  -    STG 1 -- Patient to be independent with beginner HEP including balance and strength.  -    STG 2 -- Patient ambulate 80 feet contact-guard with rolling walker with improved supportive left AFO.  -    STG 3 -- Patient a send descend 6 inch curb contact-guard assist with rwx.  -    STG 4 -- Patient to perform sit to stand from armchair with equal weight shift and improved eccentric control SBA.  -    STG 5 --  Patient to be assessed on the Tinetti assessment tool.  -       Long Term Goals    LTG Date to Achieve -- 01/05/24  -    LTG 1 -- Patient to be independent with advanced HEP including lower extremity strength and balance.  -    LTG 2 -- Patient to have  orthotic assessment for improved fitting/supportive left AFO  -    LTG 3 -- Patient to score equal to or greater than 19/24 on the Tinetti balance assessment tool indicating moderate falls risk (improved from high).  -    LTG 4 -- Patient ambulate 200 feet supervision rolling walker indicating improved safety household and community ambulator.  -    LTG 5 -- Patient ascend descend 4 steps with 1 handrail standby assist nonreciprocal w supportive AFOs.  -       Time Calculation    PT Goal Re-Cert Due Date 12/08/23  - --              User Key  (r) = Recorded By, (t) = Taken By, (c) = Cosigned By      Initials Name Provider Type     Carmel Davidson, PT Physical Therapist                     PT Assessment/Plan       Row Name 11/10/23 6632          PT Assessment    Functional Limitations Decreased safety during functional activities;Impaired gait;Impaired locomotion;Limitation in home management;Limitations in functional capacity and performance;Limitations in community activities;Performance in work activities;Performance in sport activities;Performance in self-care ADL;Performance in leisure activities  -     Assessment Comments Patient 77-year-old female referred to OP PT hx radiculopathy and peripheral neuropathy in both lower extremities, pt uses bilateral AFOs (off the shelf posterior leaf from [place].  Patient reports she was given a better left AFO from [place] about 6 months ago but it does not fit well). PMH Cervical spine sx, hypertension, chronic kidney disease, CVA, arthritis, GERD.  Patient lives at home with spouse 2 steps to enter uses chairlift upstairs bed and bath, uses rolling walker and rollator for ambulation.  On PT exam  patient with significant bilateral lower extremity weakness L>R,does report some bowel/bladder incontinence, freqent falls (most recent this week when the rollator got out from underneath her).  Patient scored a 33 on the TUG indicating high falls risk, using rolling walker and bilateral AFOs.  Patient may benefit from skilled outpatient PT services to address strength, balance deficits  and decreased falls risk. Pt also has pending EMG/conduction study to be performed at the end of the month.  PT did review spine imaging.  -     Please refer to paper survey for additional self-reported information Yes  -     Rehab Potential Good  -     Patient/caregiver participated in establishment of treatment plan and goals Yes  -     Patient would benefit from skilled therapy intervention Yes  -LH        PT Plan    PT Frequency 2x/week  -     Predicted Duration of Therapy Intervention (PT) 8 wks  -               User Key  (r) = Recorded By, (t) = Taken By, (c) = Cosigned By      Initials Name Provider Type     Carmel Davidson, PT Physical Therapist                        OP Exercises       Row Name 11/10/23 1200             Subjective    Subjective Comments My legs are progressively getting weaker I have foot drop  -         Subjective Pain    Able to rate subjective pain? yes  -      Pre-Treatment Pain Level 0  -      Post-Treatment Pain Level 0  -                User Key  (r) = Recorded By, (t) = Taken By, (c) = Cosigned By      Initials Name Provider Type     Carmel Davidson, PT Physical Therapist                                Outcome Measure Options: Timed Up and Go (TUG)  Timed Up and Go (TUG)  TUG Test 1: 33 seconds (sec)    Time Calculation:   Start Time: 1015  Stop Time: 1100  Time Calculation (min): 45 min  Total Timed Code Minutes- PT: 45 minute(s)   Therapy Charges for Today       Code Description Service Date Service Provider Modifiers Qty    18371705784  PT EVAL MOD COMPLEXITY 3 11/10/2023  Carmel Davidson, PT GP 1            PT G-Codes  Outcome Measure Options: Timed Up and Go (TUG)  TUG Test 1: 33 seconds (sec)         Carmel Davidson, PT  11/10/2023

## 2023-11-14 ENCOUNTER — HOSPITAL ENCOUNTER (OUTPATIENT)
Dept: PHYSICAL THERAPY | Facility: HOSPITAL | Age: 78
Setting detail: THERAPIES SERIES
Discharge: HOME | End: 2023-11-14
Payer: MEDICARE

## 2023-11-14 DIAGNOSIS — M21.371 BILATERAL FOOT-DROP: ICD-10-CM

## 2023-11-14 DIAGNOSIS — M48.02 CERVICAL STENOSIS OF SPINAL CANAL: ICD-10-CM

## 2023-11-14 DIAGNOSIS — M21.372 BILATERAL FOOT-DROP: ICD-10-CM

## 2023-11-14 DIAGNOSIS — Z86.73 HISTORY OF CVA (CEREBROVASCULAR ACCIDENT): Primary | ICD-10-CM

## 2023-11-14 PROCEDURE — 97112 NEUROMUSCULAR REEDUCATION: CPT

## 2023-11-14 NOTE — THERAPY TREATMENT NOTE
Outpatient Physical Therapy Neuro Treatment Note  [facility]     Patient Name: Leandra Wesley  : 1945  MRN: [id number]  Today's Date: 2023      Visit Date: 2023    Visit Dx:    ICD-10-CM ICD-9-CM   1. History of CVA (cerebrovascular accident)  Z86.73 V12.54   2. Cervical stenosis of spinal canal  M48.02 723.0   3. Bilateral foot-drop  M21.371 736.79    M21.372        Patient Active Problem List   Diagnosis    Cervical stenosis of spinal canal    History of CVA (cerebrovascular accident)    Hypertension    GERD (gastroesophageal reflux disease)    Carotid stenosis    CKD (chronic kidney disease) stage 3, GFR 30-59 ml/min    Headache, common migraine, intractable    COVID-19 virus detected            PT Neuro       Row Name 23 1100             Subjective    Subjective Comments I remembered to bring my other L AFO. We had to temporary move this weekend our floors are getting done at house  -         Precautions and Contraindications    Precautions/Limitations fall precautions  -      Precautions Bilateral posterior leaf AFOs  -         Subjective Pain    Able to rate subjective pain? yes  -      Pre-Treatment Pain Level 0  -      Post-Treatment Pain Level 0  -         Cognition    Overall Cognitive Status WFL  -      Arousal/Alertness Appropriate responses to stimuli  -         Posture/Observations    Posture/Observations Comments Spouse brought patient to clinic in transport chair from hospital  -         Bed Mobility    Bed Mobility rolling left  -LH      Rolling Left [place] (Bed Mobility) standby assist;verbal cues  -LH      Rolling Right [place] (Bed Mobility) standby assist;verbal cues  -      Supine-Sit [place] (Bed Mobility) standby assist;verbal cues  -      Sit-Supine [place] (Bed Mobility) standby assist;verbal cues  -      Sidelying-Sit [place] (Bed Mobility) standby assist;verbal cues  -      Sit-Sidelying [place] (Bed  Mobility) standby assist;verbal cues  -      Comment, (Bed Mobility) Met mat, logrolling  -         Transfers    Sit-Stand [place] (Transfers) contact guard;verbal cues;nonverbal cues (demo/gesture)  -      Stand-Sit [place] (Transfers) contact guard;verbal cues;nonverbal cues (demo/gesture)  -      Transfers, Sit-Stand-Sit, Assist Device rolling walker  -      Transfer, Safety Issues balance decreased during turns;step length decreased;weight-shifting ability decreased  -      Transfer, Impairments ROM decreased;strength decreased;impaired balance;sensory feedback impaired  -      Comment, (Transfers) Increased difficulty to stand from seated position from low wheelchair  -         Gait/Stairs (Locomotion)    [place] Level (Gait) contact guard;verbal cues  -      Assistive Device (Gait) walker, front-wheeled  -      Distance in Feet (Gait) 30  -      Pattern (Gait) step-through  -      Deviations/Abnormal Patterns (Gait) bilateral deviations;weight shifting decreased;stride length decreased;base of support, wide;mariana decreased  -      Bilateral Gait Deviations foot drop/toe drag;forward flexed posture  -      Left Sided Gait Deviations knee hyperextension  -      Comment, (Gait/Stairs) Bilateral AFOs donned.  Following with wheelchair for safety  -         Orthotics & Prosthetics Management    Orthosis Location AFO (ankle foot orthosis)  -      Additional Documentation Orthosis Location (Row)  -         Ankle Foot Orthosis Management    Type (Ankle/Foot Orthosis) left;right;AFO (ankle foot orthosis);posterior leaf spring  -                User Key  (r) = Recorded By, (t) = Taken By, (c) = Cosigned By      Initials Name Provider Type     Carmel Davidson, PT Physical Therapist                             PT Assessment/Plan       Row Name 11/14/23 2693          PT Assessment    Assessment Comments Patient tolerated supine hook lying light resistance lower  extremity strength training exercises today, using YTB.  Patient did require left lower extremity assist for heel slides (eccentrically) due to weak hamstring.  Patient did bring in an extra left AFO that she was provided from [facility] will continue to trial as it is more supportive than the off-the-shelf one she is currently wearing.  -     Please refer to paper survey for additional self-reported information Yes  -     Rehab Potential Excellent  -     Patient/caregiver participated in establishment of treatment plan and goals Yes  -     Patient would benefit from skilled therapy intervention Yes  -        PT Plan    PT Frequency 2x/week  -               User Key  (r) = Recorded By, (t) = Taken By, (c) = Cosigned By      Initials Name Provider Type     Carmel Davidson, PT Physical Therapist                        OP Exercises       Row Name 11/14/23 1112 11/14/23 1100 11/14/23 1000       Subjective    Subjective Comments -- I remembered to bring my other L AFO. We had to temporary move this weekend our floors are getting done at house  - --       Subjective Pain    Able to rate subjective pain? -- yes  - --    Pre-Treatment Pain Level -- 0  - --    Post-Treatment Pain Level -- 0  - --       Total Minutes    90809 -  PT Neuromuscular Reeducation Minutes 45  -LH -- --       Exercise 1    Exercise Name 1 -- -- SAQ bolster  -    Cueing 1 -- -- Verbal  -    Sets 1 -- -- 1  -LH    Reps 1 -- -- 20  -LH       Exercise 2    Exercise Name 2 -- -- quad sets  -    Cueing 2 -- -- Verbal  -    Sets 2 -- -- 1  -LH    Reps 2 -- -- 20  -LH       Exercise 3    Exercise Name 3 -- -- heel slides powder board  -    Cueing 3 -- -- Verbal  -    Sets 3 -- -- 1  -    Reps 3 -- -- 20  -LH    Additional Comments -- -- SIS LLE  -       Exercise 4    Exercise Name 4 -- -- ham curls bolster  -    Cueing 4 -- -- Verbal  -    Sets 4 -- -- 1  -LH    Reps 4 -- -- 20  -LH    Additional Comments -- -- YTB  -        Exercise 5    Exercise Name 5 -- -- hip add/abd YTB  -    Cueing 5 -- -- Verbal  -LH    Sets 5 -- -- 2  -LH    Reps 5 -- -- 10  -LH       Exercise 6    Exercise Name 6 -- -- clam shells  -    Cueing 6 -- -- Verbal  -LH    Sets 6 -- -- 2  -LH    Reps 6 -- -- 10  -LH    Additional Comments -- -- YTB  -       Exercise 7    Exercise Name 7 -- -- bridge  -LH    Cueing 7 -- -- Verbal  -LH    Sets 7 -- -- 2  -LH    Reps 7 -- -- 10  -LH              User Key  (r) = Recorded By, (t) = Taken By, (c) = Cosigned By      Initials Name Provider Type     Carmel Davidson, PT Physical Therapist                                    Therapy Education  Education Details: TherEX as performed in clinic today: Bridge, clamshell, quad set, heel slide, Hook lying hip AB and adduction  Given: HEP  Program: New  How Provided: Verbal, Demonstration, Written  Provided to: Patient, Caregiver  Level of Understanding: Verbalized, Demonstrated, Teach back education performed              Time Calculation:   Start Time: 1015  Stop Time: 1100  Time Calculation (min): 45 min  Total Timed Code Minutes- PT: 45 minute(s)  Timed Charges  90390 -  PT Neuromuscular Reeducation Minutes: 45  Total Minutes  Timed Charges Total Minutes: 45   Total Minutes: 45   Therapy Charges for Today       Code Description Service Date Service Provider Modifiers Qty    [id number] HC PT NEUROMUSC RE EDUCATION EA 15 MIN 11/14/2023 Carmel Davidson, PT GP 3                      Carmel Davidson, PT  11/14/2023

## 2023-11-17 ENCOUNTER — HOSPITAL ENCOUNTER (OUTPATIENT)
Dept: PHYSICAL THERAPY | Facility: HOSPITAL | Age: 78
Setting detail: THERAPIES SERIES
Discharge: HOME | End: 2023-11-17
Payer: MEDICARE

## 2023-11-17 DIAGNOSIS — M21.372 BILATERAL FOOT-DROP: ICD-10-CM

## 2023-11-17 DIAGNOSIS — M21.371 BILATERAL FOOT-DROP: ICD-10-CM

## 2023-11-17 DIAGNOSIS — M48.02 CERVICAL STENOSIS OF SPINAL CANAL: ICD-10-CM

## 2023-11-17 DIAGNOSIS — Z86.73 HISTORY OF CVA (CEREBROVASCULAR ACCIDENT): Primary | ICD-10-CM

## 2023-11-17 DIAGNOSIS — G60.9 PERIPHERAL NEUROPATHY, IDIOPATHIC: ICD-10-CM

## 2023-11-17 PROCEDURE — 97530 THERAPEUTIC ACTIVITIES: CPT

## 2023-11-17 PROCEDURE — 97112 NEUROMUSCULAR REEDUCATION: CPT

## 2023-11-17 NOTE — THERAPY TREATMENT NOTE
Outpatient Physical Therapy Neuro Treatment Note  [facility]     Patient Name: Leandra Wesley  : 1945  MRN: [id number]  Today's Date: 2023      Visit Date: 2023    Visit Dx:    ICD-10-CM ICD-9-CM   1. History of CVA (cerebrovascular accident)  Z86.73 V12.54   2. Cervical stenosis of spinal canal  M48.02 723.0   3. Bilateral foot-drop  M21.371 736.79    M21.372    4. Peripheral neuropathy, idiopathic  G60.9 356.9       Patient Active Problem List   Diagnosis    Cervical stenosis of spinal canal    History of CVA (cerebrovascular accident)    Hypertension    GERD (gastroesophageal reflux disease)    Carotid stenosis    CKD (chronic kidney disease) stage 3, GFR 30-59 ml/min    Headache, common migraine, intractable    COVID-19 virus detected            PT Neuro       Row Name 23 1000             Subjective    Subjective Comments no falls this week, I get to move back into my house .  -         Precautions and Contraindications    Precautions/Limitations fall precautions  -      Precautions Bilateral posterior leaf AFOs  -         Subjective Pain    Able to rate subjective pain? yes  -      Pre-Treatment Pain Level 0  -      Post-Treatment Pain Level 0  -         Cognition    Overall Cognitive Status WFL  -      Arousal/Alertness Appropriate responses to stimuli  -         Posture/Observations    Posture/Observations Comments Spouse brought patient to clinic in transport chair from hospital  -         Bed Mobility    Comment, (Bed Mobility) NT  -         Transfers    Sit-Stand [place] (Transfers) contact guard;verbal cues;nonverbal cues (demo/gesture)  -      Stand-Sit [place] (Transfers) contact guard;verbal cues;nonverbal cues (demo/gesture)  -      Transfers, Sit-Stand-Sit, Assist Device rolling walker  -      Transfer, Safety Issues balance decreased during turns;step length decreased;weight-shifting ability decreased  -      Transfer,  Impairments ROM decreased;strength decreased;impaired balance;sensory feedback impaired  -      Comment, (Transfers) Vcs for inc eccentric control stand to sit  -         Gait/Stairs (Locomotion)    [place] (Gait) contact guard;verbal cues  -      Assistive Device (Gait) walker, front-wheeled  -      Distance in Feet (Gait) 40, 30  -      Pattern (Gait) step-through  -      Deviations/Abnormal Patterns (Gait) bilateral deviations;weight shifting decreased;stride length decreased;base of support, wide;mariana decreased  -      Bilateral Gait Deviations foot drop/toe drag;forward flexed posture  -      Left Sided Gait Deviations knee hyperextension  -      Comment, (Gait/Stairs) Bilateral AFOs donned. Following with wheelchair for safety  -         Orthotics & Prosthetics Management    Orthosis Location AFO (ankle foot orthosis)  -         Ankle Foot Orthosis Management    Type (Ankle/Foot Orthosis) left;right;AFO (ankle foot orthosis)  off the self posterior leaf  -                User Key  (r) = Recorded By, (t) = Taken By, (c) = Cosigned By      Initials Name Provider Type     Carmel Davidson, PT Physical Therapist                             PT Assessment/Plan       Row Name 11/17/23 1100          PT Assessment    Assessment Comments Patient tolerated lower extremity seated strengthening exercises performed today using resisted band yellow Thera-Band.  Patient denies falls since initiating therapy.  Trialed longer stretch posterior leaf L AFO this session, patient complained of ankle discomfort, footplate is thicker and patient's balance felt off and foot was noting to ER with gait, transition patient back into her shorter strut posterior leaf  L AFO (she uses bilaterally).  Bilateral upper extremity flexion finger/ tendon spasms noted with exercise PT plans to notify neurologist of findings during clinic.  -     Please refer to paper survey for additional self-reported  information Yes  -     Rehab Potential Good  -     Patient/caregiver participated in establishment of treatment plan and goals Yes  -     Patient would benefit from skilled therapy intervention Yes  -        PT Plan    PT Frequency 2x/week  -               User Key  (r) = Recorded By, (t) = Taken By, (c) = Cosigned By      Initials Name Provider Type     Carmel Davidson, PT Physical Therapist                        OP Exercises       Row Name 11/17/23 1000             Subjective    Subjective Comments no falls this week, I get to move back into my house sunday.  -         Subjective Pain    Able to rate subjective pain? yes  -LH      Pre-Treatment Pain Level 0  -LH      Post-Treatment Pain Level 0  -         Exercise 8    Exercise Name 8 seated therex- MIP, knee flex/ext, hip abd/add  -      Cueing 8 Verbal  -      Sets 8 1  -LH      Reps 8 20  -LH      Additional Comments YTB  -         Exercise 9    Exercise Name 9 seated heel slides  -      Cueing 9 Verbal  -      Sets 9 1  -LH      Reps 9 20  -LH      Additional Comments pillow case  -         Exercise 10    Exercise Name 10 seated ham stretch  -      Cueing 10 Verbal  -      Sets 10 1  -LH      Reps 10 2  -LH      Time 10 3 sec  -LH      Additional Comments sheet  -LH         Exercise 11    Exercise Name 11 sit to stand  -      Cueing 11 Verbal;Demo  -      Sets 11 1  -LH      Reps 11 5  -LH      Additional Comments armed chair  -         Exercise 12    Exercise Name 12 ankle PF seated  -LH      Cueing 12 Verbal  -      Reps 12 2  -LH      Time 12 10  -LH      Additional Comments YTB  -                User Key  (r) = Recorded By, (t) = Taken By, (c) = Cosigned By      Initials Name Provider Type     Carmel Davidson, PT Physical Therapist                                    Therapy Education  Education Details: demo'ed and showed pt/spouse benefits of transport chair.  Place pillow on couch to elevate service. 5 times sit  to stand.  Given: Fall prevention and home safety, HEP  Program: Reinforced  How Provided: Verbal  Provided to: Patient, Caregiver  Level of Understanding: Verbalized              Time Calculation:   Start Time: 1015  Stop Time: 1100  Time Calculation (min): 45 min  Total Timed Code Minutes- PT: 45 minute(s)   Therapy Charges for Today       Code Description Service Date Service Provider Modifiers Qty    15821099661  PT NEUROMUSC RE EDUCATION EA 15 MIN 11/17/2023 Carmel Davidson, PT GP 2    [id number]  PT THERAPEUTIC ACT EA 15 MIN 11/17/2023 Carmel Davidson, PT GP 1                      Carmel Davidson, PT  11/17/2023

## 2023-11-21 ENCOUNTER — APPOINTMENT (OUTPATIENT)
Dept: PHYSICAL THERAPY | Facility: HOSPITAL | Age: 78
End: 2023-11-21
Payer: MEDICARE

## 2023-11-24 ENCOUNTER — APPOINTMENT (OUTPATIENT)
Dept: PHYSICAL THERAPY | Facility: HOSPITAL | Age: 78
End: 2023-11-24
Payer: MEDICARE

## 2023-11-28 ENCOUNTER — HOSPITAL ENCOUNTER (OUTPATIENT)
Dept: PHYSICAL THERAPY | Facility: HOSPITAL | Age: 78
Setting detail: THERAPIES SERIES
Discharge: HOME | End: 2023-11-28
Payer: MEDICARE

## 2023-11-28 DIAGNOSIS — G60.9 PERIPHERAL NEUROPATHY, IDIOPATHIC: ICD-10-CM

## 2023-11-28 DIAGNOSIS — M21.371 BILATERAL FOOT-DROP: ICD-10-CM

## 2023-11-28 DIAGNOSIS — M21.372 BILATERAL FOOT-DROP: ICD-10-CM

## 2023-11-28 DIAGNOSIS — M48.02 CERVICAL STENOSIS OF SPINAL CANAL: ICD-10-CM

## 2023-11-28 DIAGNOSIS — Z86.73 HISTORY OF CVA (CEREBROVASCULAR ACCIDENT): Primary | ICD-10-CM

## 2023-11-28 PROCEDURE — 97110 THERAPEUTIC EXERCISES: CPT

## 2023-11-28 PROCEDURE — 97530 THERAPEUTIC ACTIVITIES: CPT

## 2023-11-28 NOTE — THERAPY TREATMENT NOTE
Outpatient Physical Therapy Neuro Treatment Note  [facility]     Patient Name: Leandra Wesley  : 1945  MRN: [id number]  Today's Date: 2023      Visit Date: 2023    Visit Dx:    ICD-10-CM ICD-9-CM   1. History of CVA (cerebrovascular accident)  Z86.73 V12.54   2. Cervical stenosis of spinal canal  M48.02 723.0   3. Bilateral foot-drop  M21.371 736.79    M21.372    4. Peripheral neuropathy, idiopathic  G60.9 356.9       Patient Active Problem List   Diagnosis    Cervical stenosis of spinal canal    History of CVA (cerebrovascular accident)    Hypertension    GERD (gastroesophageal reflux disease)    Carotid stenosis    CKD (chronic kidney disease) stage 3, GFR 30-59 ml/min    Headache, common migraine, intractable    COVID-19 virus detected            PT Neuro       Row Name 23 1200             Subjective    Subjective Comments weve back into our home. i had a hard time standing up from our low commode this weekend, my  had to help me.  I also slid forward off the couch was leaning over for the dog food bowl, was able to use the couch to help pull myself up back to sitting  -         Precautions and Contraindications    Precautions/Limitations fall precautions  -      Precautions Bilateral posterior leaf AFOs  -LH         Subjective Pain    Able to rate subjective pain? yes  -LH      Pre-Treatment Pain Level 5  -LH      Post-Treatment Pain Level 5  -LH      Subjective Pain Comment Back  -         Cognition    Overall Cognitive Status WFL  -      Arousal/Alertness Appropriate responses to stimuli  -         Posture/Observations    Posture/Observations Comments Spouse brought patient to clinic in transport chair  -LH         Bed Mobility    Rolling Left [place] (Bed Mobility) standby assist;verbal cues  -LH      Rolling Right [place] (Bed Mobility) standby assist;verbal cues  -LH      Supine-Sit [place] (Bed Mobility) standby assist;verbal cues  -LH       Sit-Supine [place] (Bed Mobility) standby assist;verbal cues  -      Sidelying-Sit [place] (Bed Mobility) standby assist;verbal cues  -      Sit-Sidelying [place] (Bed Mobility) standby assist;verbal cues  -      Bed Mobility, Safety Issues decreased use of legs for bridging/pushing  -      Comment, (Bed Mobility) tx mat, log rollling  -         Transfers    Sit-Stand [place] (Transfers) contact guard;verbal cues;nonverbal cues (demo/gesture)  -      Stand-Sit [place] (Transfers) contact guard;verbal cues;nonverbal cues (demo/gesture)  -      Transfers, Sit-Stand-Sit, Assist Device rolling walker  -      Transfer, Safety Issues balance decreased during turns;step length decreased;weight-shifting ability decreased  -      Transfer, Impairments ROM decreased;strength decreased;impaired balance;sensory feedback impaired  -      Comment, (Transfers) Vcs push-up from bed versus pulling walker  -         Gait/Stairs (Locomotion)    [place] Level (Gait) contact guard;verbal cues  -      Assistive Device (Gait) walker, front-wheeled  -      Distance in Feet (Gait) 40  -      Pattern (Gait) step-through  -      Deviations/Abnormal Patterns (Gait) bilateral deviations;weight shifting decreased;stride length decreased;base of support, wide;mariana decreased  -      Bilateral Gait Deviations foot drop/toe drag;forward flexed posture  -      Left Sided Gait Deviations foot drop/toe drag  -      Comment, (Gait/Stairs) Bilateral AFOs donned. Following with wheelchair for safety  -         Ankle Foot Orthosis Management    Type (Ankle/Foot Orthosis) left;right;AFO (ankle foot orthosis)  off the self posterior leaf  -                User Key  (r) = Recorded By, (t) = Taken By, (c) = Cosigned By      Initials Name Provider Type     Carmel Davidson, PT Physical Therapist                             PT Assessment/Plan       Row Name 11/28/23 3423          PT  Assessment    Assessment Comments Patient did purchase a transport chair per PT recommendations and use that for transport to get patient to clinic today. patient/spouse was notified that this PT called Dr. Suero's office last Friday (patient missed PT appointment due to schedule conflict) and notified office of PT concerns with patient weakness upper and lower extremities.  Dr. Suero's office said they will call patient if there is an opening prior to her scheduled appointment (2/1/24).  Dr. Suero's office said they cannot move up EMG appointment due to that scheduled in the hospital.  Patient tolerated lower extremity strengthening exercises however continues with this bilateral lower extremity weakness L>R and noted upper extremity hand/finger flexion contractures at times.  -     Please refer to paper survey for additional self-reported information Yes  -     Rehab Potential Good  -     Patient/caregiver participated in establishment of treatment plan and goals Yes  -     Patient would benefit from skilled therapy intervention Yes  -        PT Plan    PT Frequency 2x/week  -               User Key  (r) = Recorded By, (t) = Taken By, (c) = Cosigned By      Initials Name Provider Type     Carmel Davidson, PT Physical Therapist                        OP Exercises       Row Name 11/28/23 1226 11/28/23 1200          Subjective    Subjective Comments -- weve back into our home. i had a hard time standing up from our low commode this weekend, my  had to help me.  I also slid forward off the couch was leaning over for the dog food bowl, was able to use the couch to help pull myself up back to sitting  -        Subjective Pain    Able to rate subjective pain? -- yes  -     Pre-Treatment Pain Level -- 5  -LH     Post-Treatment Pain Level -- 5  -LH     Subjective Pain Comment -- Back  -        Total Minutes    06199 - PT Therapeutic Exercise Minutes 30  -LH --     20377 - PT Therapeutic Activity  Minutes 15  -LH --        Exercise 1    Exercise Name 1 -- SAQ bolster  -LH     Cueing 1 -- Verbal  -LH     Sets 1 -- 1  -LH     Reps 1 -- 20  -LH        Exercise 3    Exercise Name 3 -- heel slides powder board  -LH     Cueing 3 -- Verbal  -LH     Sets 3 -- 1  -LH     Reps 3 -- 20  -LH     Additional Comments -- BLEs  -LH        Exercise 4    Exercise Name 4 -- ham curls bolster  -LH     Cueing 4 -- Verbal  -LH     Sets 4 -- 1  -LH     Reps 4 -- 20  -LH     Additional Comments -- YTB  -LH        Exercise 5    Exercise Name 5 -- hip add/abd YTB  -LH     Cueing 5 -- Verbal  -LH     Sets 5 -- 1  -LH     Reps 5 -- 20  -LH        Exercise 6    Exercise Name 6 -- clam shells  -LH     Cueing 6 -- Verbal  -LH     Sets 6 -- 1  -LH     Reps 6 -- 20  -LH     Additional Comments -- YTB  -LH        Exercise 7    Exercise Name 7 -- bridge  -LH     Cueing 7 -- Verbal  -LH     Sets 7 -- 1  -LH     Reps 7 -- 10  -LH               User Key  (r) = Recorded By, (t) = Taken By, (c) = Cosigned By      Initials Name Provider Type     Carmel Davidson, PT Physical Therapist                                    Therapy Education  Education Details: That this PT call Dr. Suero's office last Friday to notify them of PT concerns of continued lower extremity weakness and observed upper extremity hand /finger contractures when pt exerts force through hands (pushing up from chair).  Dr. Suero's office said that they will call patient if they have an opening to get her in sooner if possible.  Patient showed bedside commode to purchase for home for elevated commode height and for armrest to push up from.  Instructed to minimize ambulation at home due to high falls risk due to lower extremity weakness  Given: Symptoms/condition management, Fall prevention and home safety  Program: New  How Provided: Verbal  Provided to: Patient, Caregiver (spouse present for PT session.)              Time Calculation:   Start Time: 1015  Stop Time: 1100  Time  Calculation (min): 45 min  Total Timed Code Minutes- PT: 45 minute(s)  Timed Charges  12359 - PT Therapeutic Exercise Minutes: 30  73108 - PT Therapeutic Activity Minutes: 15  Total Minutes  Timed Charges Total Minutes: 45   Total Minutes: 45   Therapy Charges for Today       Code Description Service Date Service Provider Modifiers Qty    [id number]  PT THER PROC EA 15 MIN 11/28/2023 Carmel Davidson, PT GP 2    [id number]  PT THERAPEUTIC ACT EA 15 MIN 11/28/2023 Carmel Davidson, PT GP 1                      Carmel Davidson, PT  11/28/2023

## 2023-12-01 ENCOUNTER — HOSPITAL ENCOUNTER (OUTPATIENT)
Dept: PHYSICAL THERAPY | Facility: HOSPITAL | Age: 78
Setting detail: THERAPIES SERIES
Discharge: HOME | End: 2023-12-01
Payer: MEDICARE

## 2023-12-01 ENCOUNTER — TELEPHONE (OUTPATIENT)
Dept: NEUROLOGY | Facility: CLINIC | Age: 78
End: 2023-12-01
Payer: MEDICARE

## 2023-12-01 DIAGNOSIS — M48.02 CERVICAL STENOSIS OF SPINAL CANAL: ICD-10-CM

## 2023-12-01 DIAGNOSIS — Z86.73 HISTORY OF CVA (CEREBROVASCULAR ACCIDENT): Primary | ICD-10-CM

## 2023-12-01 DIAGNOSIS — G60.9 PERIPHERAL NEUROPATHY, IDIOPATHIC: ICD-10-CM

## 2023-12-01 DIAGNOSIS — M21.372 BILATERAL FOOT-DROP: ICD-10-CM

## 2023-12-01 DIAGNOSIS — M21.371 BILATERAL FOOT-DROP: ICD-10-CM

## 2023-12-01 PROCEDURE — 97112 NEUROMUSCULAR REEDUCATION: CPT

## 2023-12-01 NOTE — THERAPY TREATMENT NOTE
Outpatient Physical Therapy Neuro Treatment Note  [facility]     Patient Name: Leandra Wesley  : 1945  MRN: [id number]  Today's Date: 2023      Visit Date: 2023    Visit Dx:    ICD-10-CM ICD-9-CM   1. History of CVA (cerebrovascular accident)  Z86.73 V12.54   2. Cervical stenosis of spinal canal  M48.02 723.0   3. Bilateral foot-drop  M21.371 736.79    M21.372    4. Peripheral neuropathy, idiopathic  G60.9 356.9       Patient Active Problem List   Diagnosis    Cervical stenosis of spinal canal    History of CVA (cerebrovascular accident)    Hypertension    GERD (gastroesophageal reflux disease)    Carotid stenosis    CKD (chronic kidney disease) stage 3, GFR 30-59 ml/min    Headache, common migraine, intractable    COVID-19 virus detected            PT Neuro       Row Name 23 1100             Subjective    Subjective Comments my low back is hurting, worse in standing  -         Precautions and Contraindications    Precautions/Limitations fall precautions  -      Precautions Bilateral posterior leaf AFOs  -         Subjective Pain    Able to rate subjective pain? yes  -      Pre-Treatment Pain Level 8  -      Post-Treatment Pain Level 8  -      Subjective Pain Comment back  -         Cognition    Overall Cognitive Status WFL  -      Arousal/Alertness Appropriate responses to stimuli  -         Posture/Observations    Posture/Observations Comments Spouse brought patient to clinic in transport chair  -         Transfers    Sit-Stand [place] (Transfers) contact guard;verbal cues;nonverbal cues (demo/gesture)  -      Stand-Sit [place] (Transfers) contact guard;verbal cues;nonverbal cues (demo/gesture)  -      Transfers, Sit-Stand-Sit, Assist Device rolling walker  -      Transfer, Safety Issues balance decreased during turns;step length decreased;weight-shifting ability decreased  -      Transfer, Impairments ROM decreased;strength decreased;impaired  balance;sensory feedback impaired  -         Gait/Stairs (Locomotion)    [place] Level (Gait) contact guard;verbal cues  -      Assistive Device (Gait) walker, front-wheeled  -      Distance in Feet (Gait) 40x2, 50  -      Pattern (Gait) step-through  -      Deviations/Abnormal Patterns (Gait) bilateral deviations;weight shifting decreased;stride length decreased;base of support, wide;mariana decreased  -      Bilateral Gait Deviations foot drop/toe drag;forward flexed posture  -      Left Sided Gait Deviations foot drop/toe drag  inc effort noted to swing LLE through  -      Comment, (Gait/Stairs) Bilateral AFOs donned. Following with wheelchair for safety  -         Ankle Foot Orthosis Management    Type (Ankle/Foot Orthosis) left;right;bilateral;AFO (ankle foot orthosis)  off the self posterior leaf  -                User Key  (r) = Recorded By, (t) = Taken By, (c) = Cosigned By      Initials Name Provider Type     Carmel Davidson, PT Physical Therapist                             PT Assessment/Plan       Row Name 12/01/23 5970          PT Assessment    Assessment Comments Pt tolerated increased reps for LE strengthening exercise, LLE continues to be weaker vs R however both LEs are weak. Pt did require assist for LLE therex completion. Increased gait trials this session up to 50ft CGA rwx, following w pt transport chair for safety. PT did contact Dr Birmingham office again this morning and message was left for his MA of PT findings (progressive LE weakness, incontinence hx and UE hand/wrist contracturing).  -     Please refer to paper survey for additional self-reported information Yes  -     Rehab Potential Good  -     Patient/caregiver participated in establishment of treatment plan and goals Yes  -     Patient would benefit from skilled therapy intervention Yes  -        PT Plan    PT Frequency 2x/week  -               User Key  (r) = Recorded By, (t) = Taken By, (c) =  Cosigned By      Initials Name Provider Type     Carmel Davidson, PT Physical Therapist                        OP Exercises       Row Name 12/01/23 1200 12/01/23 1100          Subjective    Subjective Comments -- my low back is hurting, worse in standing  -        Subjective Pain    Able to rate subjective pain? -- yes  -LH     Pre-Treatment Pain Level -- 8  -LH     Post-Treatment Pain Level -- 8  -LH     Subjective Pain Comment -- back  -        Total Minutes    72663 - PT Therapeutic Exercise Minutes 45  -LH --        Exercise 1    Exercise Name 1 -- SAQ bolster  -     Cueing 1 -- Verbal  -LH     Sets 1 -- 1  -LH     Reps 1 -- 25  -LH        Exercise 3    Exercise Name 3 -- heel slides powder board  -     Cueing 3 -- Verbal  -LH     Sets 3 -- 1  -LH     Reps 3 -- 25  -LH     Additional Comments -- assist LLE  -LH        Exercise 4    Exercise Name 4 -- ham curls bolster  -     Cueing 4 -- Verbal  -LH     Sets 4 -- 1  -LH     Reps 4 -- 25  -LH     Additional Comments -- YTB  -        Exercise 5    Exercise Name 5 -- hip add/abd YTB  -LH     Cueing 5 -- Verbal  -LH     Sets 5 -- 1  -LH     Reps 5 -- 25  -LH        Exercise 6    Exercise Name 6 -- clam shells  -     Cueing 6 -- Verbal  -LH     Sets 6 -- 1  -LH     Reps 6 -- 25  -LH     Additional Comments -- YTB RLE , no band LLE  -        Exercise 11    Exercise Name 11 -- sit to stand  -     Cueing 11 -- Verbal  -LH     Sets 11 -- 2  -LH     Reps 11 -- 5  -LH     Additional Comments -- YTB thighs, armed chair  -               User Key  (r) = Recorded By, (t) = Taken By, (c) = Cosigned By      Initials Name Provider Type     Carmel Davidson, PT Physical Therapist                                    Therapy Education  Education Details: notified that this PT contacted Dr Birmingham office again to notifiy office of PT findings. sit to stand exercises from sturdy armed chair to faciliate ease of sit to standing and eccentric control from stand to  sit.  Given: Other (comment)  Program: New  How Provided: Verbal  Provided to: Patient  Level of Understanding: Verbalized              Time Calculation:   Start Time: 1015  Stop Time: 1100  Time Calculation (min): 45 min  Total Timed Code Minutes- PT: 45 minute(s)  Timed Charges  74444 - PT Therapeutic Exercise Minutes: 45  Total Minutes  Timed Charges Total Minutes: 45   Total Minutes: 45   Therapy Charges for Today       Code Description Service Date Service Provider Modifiers Qty    [id number] HC PT NEUROMUSC RE EDUCATION EA 15 MIN 12/1/2023 Carmel Davidson, PT GP 3                      Carmel Davidson, PT  12/1/2023

## 2023-12-01 NOTE — TELEPHONE ENCOUNTER
Carmel Davidson, PT, called to update Dr. Suero.  Patient's hands are alyssia and she has progressive lower extremity weakness.  She also states that the patient has progressive lower extremity weakness.  The worsening symptoms make it difficult for PT to work with her.    OK to discontinue PT?  Please contact Carmel at [phone].

## 2023-12-05 ENCOUNTER — HOSPITAL ENCOUNTER (OUTPATIENT)
Dept: PHYSICAL THERAPY | Facility: HOSPITAL | Age: 78
Setting detail: THERAPIES SERIES
Discharge: HOME | End: 2023-12-05
Payer: MEDICARE

## 2023-12-05 DIAGNOSIS — M48.02 CERVICAL STENOSIS OF SPINAL CANAL: ICD-10-CM

## 2023-12-05 DIAGNOSIS — G60.9 PERIPHERAL NEUROPATHY, IDIOPATHIC: ICD-10-CM

## 2023-12-05 DIAGNOSIS — Z86.73 HISTORY OF CVA (CEREBROVASCULAR ACCIDENT): Primary | ICD-10-CM

## 2023-12-05 DIAGNOSIS — M21.372 BILATERAL FOOT-DROP: ICD-10-CM

## 2023-12-05 DIAGNOSIS — M21.371 BILATERAL FOOT-DROP: ICD-10-CM

## 2023-12-05 PROCEDURE — 97112 NEUROMUSCULAR REEDUCATION: CPT

## 2023-12-05 NOTE — THERAPY TREATMENT NOTE
Outpatient Physical Therapy Neuro Treatment Note  [facility]     Patient Name: Leandra Wesley  : 1945  MRN: [id number]  Today's Date: 2023      Visit Date: 2023    Visit Dx:    ICD-10-CM ICD-9-CM   1. History of CVA (cerebrovascular accident)  Z86.73 V12.54   2. Cervical stenosis of spinal canal  M48.02 723.0   3. Bilateral foot-drop  M21.371 736.79    M21.372    4. Peripheral neuropathy, idiopathic  G60.9 356.9       Patient Active Problem List   Diagnosis    Cervical stenosis of spinal canal    History of CVA (cerebrovascular accident)    Hypertension    GERD (gastroesophageal reflux disease)    Carotid stenosis    CKD (chronic kidney disease) stage 3, GFR 30-59 ml/min    Headache, common migraine, intractable    COVID-19 virus detected            PT Neuro       Row Name 23 1200             Subjective    Subjective Comments I feel like my right leg is getting weaker, my left leg has always been weak but I feel like my right one is now getting weaker.  -LH         Precautions and Contraindications    Precautions/Limitations fall precautions  -LH      Precautions Bilateral posterior leaf AFOs  -LH         Subjective Pain    Able to rate subjective pain? yes  -LH      Pre-Treatment Pain Level 5  -LH      Post-Treatment Pain Level 5  -LH      Subjective Pain Comment back  -         Cognition    Overall Cognitive Status WFL  -      Arousal/Alertness Appropriate responses to stimuli  -         Posture/Observations    Posture/Observations Comments Spouse brought patient to clinic in transport chair  -LH         MMT Right Lower Ext    Rt Knee Flexion MMT, Gross Movement (3-/5) fair minus  -LH         MMT Left Lower Ext    Lt Knee Flexion MMT, Gross Movement (2-/5) poor minus  -LH         Bed Mobility    Rolling Left [place] (Bed Mobility) standby assist;verbal cues  -LH      Rolling Right [place] (Bed Mobility) standby assist;verbal cues  -LH      Supine-Sit [place] (Bed  Mobility) standby assist;verbal cues  -      Sit-Supine [place] (Bed Mobility) standby assist;verbal cues  -      Sidelying-Sit [place] (Bed Mobility) standby assist;verbal cues  -      Sit-Sidelying [place] (Bed Mobility) standby assist;verbal cues  -      Bed Mobility, Safety Issues decreased use of legs for bridging/pushing  -      Comment, (Bed Mobility) Treatment mat wedge with logrolling  -         Transfers    Sit-Stand [place] (Transfers) contact guard;verbal cues;nonverbal cues (demo/gesture)  -      Stand-Sit [place] (Transfers) contact guard;verbal cues;nonverbal cues (demo/gesture)  -      Transfers, Sit-Stand-Sit, Assist Device rolling walker  -      Transfer, Safety Issues balance decreased during turns;step length decreased;weight-shifting ability decreased  -      Transfer, Impairments ROM decreased;strength decreased;impaired balance;sensory feedback impaired  -         Gait/Stairs (Locomotion)    [place] Level (Gait) contact guard;verbal cues  -      Assistive Device (Gait) walker, front-wheeled  -      Distance in Feet (Gait) 80  -      Pattern (Gait) step-through  -      Deviations/Abnormal Patterns (Gait) bilateral deviations;weight shifting decreased;stride length decreased;base of support, wide;mariana decreased  -      Bilateral Gait Deviations foot drop/toe drag;forward flexed posture  -      Left Sided Gait Deviations foot drop/toe drag  inc effort noted to swing LLE through  -      Comment, (Gait/Stairs) Bilateral AFOs donned. Following with wheelchair for safety  -         Ankle Foot Orthosis Management    Type (Ankle/Foot Orthosis) left;right;AFO (ankle foot orthosis)  off the self posterior leaf  -                User Key  (r) = Recorded By, (t) = Taken By, (c) = Cosigned By      Initials Name Provider Type     Carmel Davidson PT Physical Therapist                             PT Assessment/Plan       Row Name  12/05/23 1234          PT Assessment    Functional Limitations Decreased safety during functional activities;Impaired gait;Impaired locomotion;Limitations in community activities;Limitations in functional capacity and performance;Limitation in home management;Performance in sport activities;Performance in self-care ADL;Performance in leisure activities  -     Impairments Balance;Endurance;Gait;Impaired flexibility;Joint mobility;Locomotion;Motor function;Muscle strength;Peripheral nerve integrity;Range of motion;Sensation;Joint integrity;Impaired sensory integrity  -     Assessment Comments Patient with noted progressive lower extremity weakness.  Patient assessed on the Tinetti balance assessment tool today scoring 14 out of 28 indicating high risk for falls. PT spoke with Dr. Suero on the phone and notified him of PT findings.  Plan is to reassess patient goals 12/8 on Friday (30 day goal assessment) and if no significant change/improvement, MD and PT agree will discharge patient from PT and hold until further medical workup performed.  -     Please refer to paper survey for additional self-reported information Yes  -     Rehab Potential Good  -     Patient/caregiver participated in establishment of treatment plan and goals Yes  -     Patient would benefit from skilled therapy intervention Yes  -        PT Plan    Predicted Duration of Therapy Intervention (PT) Planned DC 12/8  -               User Key  (r) = Recorded By, (t) = Taken By, (c) = Cosigned By      Initials Name Provider Type     Carmel Davidson, PT Physical Therapist                        OP Exercises       Row Name 12/05/23 1241 12/05/23 1200          Subjective    Subjective Comments -- I feel like my right leg is getting weaker, my left leg has always been weak but I feel like my right one is now getting weaker.  -        Subjective Pain    Able to rate subjective pain? -- yes  -     Pre-Treatment Pain Level -- 5  -      Post-Treatment Pain Level -- 5  -     Subjective Pain Comment -- back  -        Total Minutes    65300 -  PT Neuromuscular Reeducation Minutes 40  -LH --        Exercise 1    Exercise Name 1 SAUNDRA bolster  -LH --     Cueing 1 Verbal  -LH --     Sets 1 1  -LH --     Reps 1 25  -LH --        Exercise 4    Exercise Name 4 ham curls bolster  -LH --     Cueing 4 Verbal  -LH --     Sets 4 1  -LH --     Reps 4 25  -LH --     Additional Comments YTB  - --        Exercise 6    Exercise Name 6 clam shells  -LH --     Cueing 6 Verbal  -LH --     Sets 6 1  -LH --     Reps 6 25  -LH --     Additional Comments YTB RLE , no band LLE  -LH --        Exercise 7    Exercise Name 7 bridge  -LH --     Cueing 7 Verbal  -LH --     Sets 7 2  -LH --     Reps 7 10  -LH --        Exercise 8    Exercise Name 8 seated therex- MIP, knee flex/ext, hip abd/add  -LH --     Cueing 8 Verbal  -LH --     Sets 8 1  -LH --     Reps 8 20  -LH --     Additional Comments YTB  - --               User Key  (r) = Recorded By, (t) = Taken By, (c) = Cosigned By      Initials Name Provider Type     Carmel Davidson, PT Physical Therapist                                 PT OP Goals       Row Name 12/05/23 1200          PT Short Term Goals    STG 1 Patient to be independent with beginner HEP including balance and strength.  -     STG 1 Progress Met  -     STG 2 Patient ambulate 80 feet contact-guard with rolling walker with improved supportive left AFO.  -     STG 2 Progress Met  -     STG 3 Patient a send descend 6 inch curb contact-guard assist with rwx.  -     STG 3 Progress Not Met  -     STG 4 Patient to perform sit to stand from armchair with equal weight shift and improved eccentric control SBA.  -     STG 4 Progress Not Met  -     STG 5 Patient to be assessed on the Tinetti assessment tool.  -     STG 5 Progress Met  -     STG 5 Progress Comments 14/28  -        Long Term Goals    LTG Date to Achieve 01/02/24  -     LTG 1  "Patient to be independent with advanced HEP including lower extremity strength and balance.  -     LTG 1 Progress Ongoing  -     LTG 2 Patient to have  orthotic assessment for improved fitting/supportive left AFO  -     LTG 2 Progress Ongoing  -     LTG 3 Patient to score equal to or greater than 19/24 on the Tinetti balance assessment tool indicating moderate falls risk (improved from high).  -     LTG 3 Progress Ongoing  -     LTG 4 Patient ambulate 200 feet supervision rolling walker indicating improved safety household and community ambulator.  -     LTG 4 Progress Ongoing  -     LTG 5 Patient ascend descend 4 steps with 1 handrail standby assist nonreciprocal w supportive AFOs.  -     LTG 5 Progress Ongoing  -               User Key  (r) = Recorded By, (t) = Taken By, (c) = Cosigned By      Initials Name Provider Type    Carmel Zamora, PT Physical Therapist                         Outcome Measure Options: Tinetti  Tinetti Assessment  Tinetti Assessment: yes  Sitting Balance: Steady,safe  Arises: Able, uses arms  Attempts to Rise: Able in 1 attempt  Immediate Standing Balance (first 5 sec): Steady, with support  Standing Balance: Steady, stance > 4 inch NEERU & requires support  Sternal Nudge (feet close together): Begins to fall  Eyes Closed (feet close together): Unsteady  Turning 360 Degrees- Steps: Continuous steps  Turning 360 Degrees- Steadiness: Steady  Sitting Down: Uses arms or not a smooth motion  Tinetti Balance Score: 9  Gait Initiation (immediate after told \"go\"): No hesitancy  Step Length- Right Swing: Right swing foot passes Left stance leg  Step Length- Left Swing: Left swing foot passes Right  Foot Clearance- Right Foot: Right foot does not completely clear floor  Foot Clearance- Left Foot: Left foot does not completely clear floor  Step Symmetry: Right and Left step length equal  Step Continuity: Stop/discontinuity between steps  Path (excursion): Mild/moderate " deviation or use of aid  Trunk: Marked sway or uses device  Base of Support: Heels apart  Gait Score: 5  Tinetti Total Score: 14  Tinetti Assistive Device: rolling walker, brace/special shoe (Bilateral AFOs)      Time Calculation:   Start Time: 1020  Stop Time: 1100  Time Calculation (min): 40 min  Total Timed Code Minutes- PT: 40 minute(s)  Timed Charges  33627 -  PT Neuromuscular Reeducation Minutes: 40  Total Minutes  Timed Charges Total Minutes: 40   Total Minutes: 40   Therapy Charges for Today       Code Description Service Date Service Provider Modifiers Qty    07748671833 HC PT NEUROMUSC RE EDUCATION EA 15 MIN 12/5/2023 Carmel Davidson, PT GP 3            PT G-Codes  Outcome Measure Options: Tinetti  Tinetti Total Score: 14         Carmel Davidson, PT  12/5/2023

## 2023-12-08 ENCOUNTER — HOSPITAL ENCOUNTER (OUTPATIENT)
Dept: PHYSICAL THERAPY | Facility: HOSPITAL | Age: 78
Setting detail: THERAPIES SERIES
Discharge: HOME | End: 2023-12-08
Payer: MEDICARE

## 2023-12-08 DIAGNOSIS — M21.371 BILATERAL FOOT-DROP: ICD-10-CM

## 2023-12-08 DIAGNOSIS — Z86.73 HISTORY OF CVA (CEREBROVASCULAR ACCIDENT): Primary | ICD-10-CM

## 2023-12-08 DIAGNOSIS — G60.9 PERIPHERAL NEUROPATHY, IDIOPATHIC: ICD-10-CM

## 2023-12-08 DIAGNOSIS — M48.02 CERVICAL STENOSIS OF SPINAL CANAL: ICD-10-CM

## 2023-12-08 DIAGNOSIS — M21.372 BILATERAL FOOT-DROP: ICD-10-CM

## 2023-12-08 PROCEDURE — 97110 THERAPEUTIC EXERCISES: CPT

## 2023-12-08 NOTE — THERAPY DISCHARGE NOTE
"    Outpatient Physical Therapy Neuro Treatment Note/Discharge Summary  [facility]     Patient Name: Leandra Wesley  : 1945  MRN: [id number]  Today's Date: 2023      Visit Date: 2023    Visit Dx:    ICD-10-CM ICD-9-CM   1. History of CVA (cerebrovascular accident)  Z86.73 V12.54   2. Cervical stenosis of spinal canal  M48.02 723.0   3. Bilateral foot-drop  M21.371 736.79    M21.372    4. Peripheral neuropathy, idiopathic  G60.9 356.9       Patient Active Problem List   Diagnosis    Cervical stenosis of spinal canal    History of CVA (cerebrovascular accident)    Hypertension    GERD (gastroesophageal reflux disease)    Carotid stenosis    CKD (chronic kidney disease) stage 3, GFR 30-59 ml/min    Headache, common migraine, intractable    COVID-19 virus detected             PT Neuro       Row Name 23 1051             Subjective    Subjective Comments When asked if her legs are tired pt responded \"I woke up with my legs tired\"  -DP         Precautions and Contraindications    Precautions/Limitations fall precautions  -DP         Subjective Pain    Able to rate subjective pain? yes  -DP      Subjective Pain Comment did not rate  -DP         Cognition    Overall Cognitive Status WFL  -DP         Posture/Observations    Posture/Observations Comments Spouse brought patient to clinic in transport chair  -DP         Bed Mobility    Supine-Sit [place] (Bed Mobility) standby assist;verbal cues  -DP      Sit-Supine [place] (Bed Mobility) standby assist;verbal cues  -DP      Bed Mobility, Safety Issues decreased use of legs for bridging/pushing  -DP      Comment, (Bed Mobility) VC to scoot to middle of pillow  -DP         Transfers    Chair-Bed [place] (Transfers) contact guard  -DP      Sit-Stand [place] (Transfers) contact guard;verbal cues;nonverbal cues (demo/gesture)  -DP      Stand-Sit [place] (Transfers) contact guard;verbal cues;nonverbal cues (demo/gesture)  -DP      " Transfers, Sit-Stand-Sit, Assist Device rolling walker  -DP      Comment, (Transfers) VC to line self up to chair before sitting down  -DP         Gait/Stairs (Locomotion)    [place] Level (Gait) contact guard;verbal cues  -DP      Assistive Device (Gait) walker, front-wheeled  -DP      Distance in Feet (Gait) 10'  -DP      Pattern (Gait) step-through  -DP      Deviations/Abnormal Patterns (Gait) bilateral deviations;weight shifting decreased;stride length decreased;base of support, wide;mariana decreased  -DP      Bilateral Gait Deviations foot drop/toe drag;forward flexed posture  -DP      Left Sided Gait Deviations foot drop/toe drag  -DP         Ankle Foot Orthosis Management    Type (Ankle/Foot Orthosis) left;right;AFO (ankle foot orthosis)  off the self posterior leaf  -DP                User Key  (r) = Recorded By, (t) = Taken By, (c) = Cosigned By      Initials Name Provider Type    Wilbur Bar, PT Physical Therapist                             PT Assessment/Plan       Row Name 12/08/23 1103          PT Assessment    Functional Limitations Decreased safety during functional activities;Impaired gait;Impaired locomotion;Limitations in community activities;Limitations in functional capacity and performance;Limitation in home management;Performance in sport activities;Performance in self-care ADL;Performance in leisure activities  -DP     Impairments Balance;Endurance;Gait;Impaired flexibility;Joint mobility;Locomotion;Motor function;Muscle strength;Peripheral nerve integrity;Range of motion;Sensation;Joint integrity;Impaired sensory integrity  -DP     Assessment Comments Pt still continues to demonstrate progressive bilateral extremity weakness and did display L knee buckle today during mini squats requiring PT providing modA to lower into chair. PT went through HEP with Leandra today, but told pt to hold off on doing the standing exercises at this time and pt's spouse is aware of this as well due to  "potential buckling in the home as pt is high falls risk. Pt scored a 14/28 on the tinetti this week indicating high falls risk. PT has been in contact with DR. Suero and PT plans to hold further treatment until further evaluation on noted progressive decline. Pt has met 3/5 STG's but did not meet curb navigation goal nor safe eccentric STS goal. Pt is to be d/c on this date.  -DP               User Key  (r) = Recorded By, (t) = Taken By, (c) = Cosigned By      Initials Name Provider Type    Wilbur Bar, PT Physical Therapist                          OP Exercises       Row Name 12/08/23 1100 12/08/23 1051          Subjective    Subjective Comments -- When asked if her legs are tired pt responded \"I woke up with my legs tired\"  -DP        Subjective Pain    Able to rate subjective pain? -- yes  -DP     Subjective Pain Comment -- did not rate  -DP        Total Minutes    24053 - PT Therapeutic Exercise Minutes 30  -DP --        Exercise 1    Exercise Name 1 -- SAQ pillow  -DP     Cueing 1 -- Verbal  -DP     Sets 1 -- 1  -DP     Reps 1 -- 10  -DP        Exercise 3    Exercise Name 3 -- heel slide  -DP     Cueing 3 -- Verbal  -DP     Sets 3 -- 1  -DP     Reps 3 -- 10  -DP     Additional Comments -- unable to perform with LLE  -DP        Exercise 4    Exercise Name 4 -- Seated heelslide LLE  -DP     Reps 4 -- 5  -DP        Exercise 5    Exercise Name 5 -- Supine and sitting clamshell  -DP     Cueing 5 -- Verbal  -DP     Sets 5 -- 2  1 for each position  -DP     Reps 5 -- 10  -DP        Exercise 6    Exercise Name 6 -- Supine abduction  -DP     Cueing 6 -- Verbal  -DP     Sets 6 -- 1  -DP     Reps 6 -- 10  -DP        Exercise 7    Exercise Name 7 -- bridge  -DP     Cueing 7 -- Verbal  -DP     Sets 7 -- 1  -DP     Reps 7 -- 10  -DP        Exercise 8    Exercise Name 8 -- LAQ with YTB  -DP     Cueing 8 -- Verbal  -DP     Sets 8 -- 1  -DP     Reps 8 -- 10  -DP        Exercise 9    Exercise Name 9 -- MIP  -DP     Cueing " 9 -- Verbal  -DP     Sets 9 -- 1  -DP     Reps 9 -- 10  -DP        Exercise 11    Exercise Name 11 -- sit to stand  -DP     Cueing 11 -- Verbal  -DP     Sets 11 -- 1  -DP     Reps 11 -- 5  -DP               User Key  (r) = Recorded By, (t) = Taken By, (c) = Cosigned By      Initials Name Provider Type    DP Wilbur Cano, PT Physical Therapist                                   PT OP Goals       Row Name 12/08/23 1100          PT Short Term Goals    STG Date to Achieve 12/08/23  -DP     STG 1 Patient to be independent with beginner HEP including balance and strength.  -DP     STG 1 Progress Met  -DP     STG 2 Patient ambulate 80 feet contact-guard with rolling walker with improved supportive left AFO.  -DP     STG 2 Progress Met  -DP     STG 3 Patient a send descend 6 inch curb contact-guard assist with rwx.  -DP     STG 3 Progress Not Met  -DP     STG 4 Patient to perform sit to stand from armchair with equal weight shift and improved eccentric control SBA.  -DP     STG 4 Progress Not Met  -DP     STG 5 Patient to be assessed on the Tinetti assessment tool.  -DP     STG 5 Progress Met  -DP     STG 5 Progress Comments 14/28  -DP        Long Term Goals    LTG Date to Achieve 01/02/24  -DP     LTG 1 Patient to be independent with advanced HEP including lower extremity strength and balance.  -DP     LTG 1 Progress Partially Met  understands HEP but will need assistance to complete  -DP     LTG 2 Patient to have  orthotic assessment for improved fitting/supportive left AFO  -DP     LTG 2 Progress Not Met  -DP     LTG 3 Patient to score equal to or greater than 19/24 on the Tinetti balance assessment tool indicating moderate falls risk (improved from high).  -DP     LTG 3 Progress Not Met  -DP     LTG 4 Patient ambulate 200 feet supervision rolling walker indicating improved safety household and community ambulator.  -DP     LTG 4 Progress Not Met  -DP     LTG 5 Patient ascend descend 4 steps with 1 handrail  standby assist nonreciprocal w supportive AFOs.  -DP     LTG 5 Progress Not Met  -DP               User Key  (r) = Recorded By, (t) = Taken By, (c) = Cosigned By      Initials Name Provider Type    Wilbur Bar PT Physical Therapist                    Therapy Education  Education Details: to avoid standing ther ex at this time and to focus on supine and sitting ther ex until progress on BLE noted  Given: HEP, Fall prevention and home safety  Program: New  How Provided: Verbal  Provided to: Patient  Level of Understanding: Verbalized            Time Calculation:   Start Time: 1016  Stop Time: 1046  Time Calculation (min): 30 min  Total Timed Code Minutes- PT: 30 minute(s)  Timed Charges  45186 - PT Therapeutic Exercise Minutes: 30  Total Minutes  Timed Charges Total Minutes: 30   Total Minutes: 30     Therapy Charges for Today       Code Description Service Date Service Provider Modifiers Qty    11985240056 HC PT THER PROC EA 15 MIN 12/8/2023 Wilbur Cano, PT GP 2                           Wilbur Cano PT  12/8/2023

## 2023-12-12 ENCOUNTER — APPOINTMENT (OUTPATIENT)
Dept: PHYSICAL THERAPY | Facility: HOSPITAL | Age: 78
End: 2023-12-12
Payer: MEDICARE

## 2023-12-15 ENCOUNTER — APPOINTMENT (OUTPATIENT)
Dept: PHYSICAL THERAPY | Facility: HOSPITAL | Age: 78
End: 2023-12-15
Payer: MEDICARE

## 2023-12-19 ENCOUNTER — APPOINTMENT (OUTPATIENT)
Dept: PHYSICAL THERAPY | Facility: HOSPITAL | Age: 78
End: 2023-12-19
Payer: MEDICARE

## 2023-12-22 ENCOUNTER — APPOINTMENT (OUTPATIENT)
Dept: PHYSICAL THERAPY | Facility: HOSPITAL | Age: 78
End: 2023-12-22
Payer: MEDICARE

## 2023-12-26 ENCOUNTER — APPOINTMENT (OUTPATIENT)
Dept: PHYSICAL THERAPY | Facility: HOSPITAL | Age: 78
End: 2023-12-26
Payer: MEDICARE

## 2023-12-29 ENCOUNTER — APPOINTMENT (OUTPATIENT)
Dept: PHYSICAL THERAPY | Facility: HOSPITAL | Age: 78
End: 2023-12-29
Payer: MEDICARE

## 2024-01-31 ENCOUNTER — TELEPHONE (OUTPATIENT)
Dept: NEUROLOGY | Facility: CLINIC | Age: 79
End: 2024-01-31
Payer: MEDICARE

## 2024-01-31 NOTE — TELEPHONE ENCOUNTER
Spoke with patient regarding new follow up appt date due to tests being done too close too f/u date. Pt agreed to reschedule. Had sent out a mail reminder.

## 2024-02-07 ENCOUNTER — HOSPITAL ENCOUNTER (OUTPATIENT)
Dept: INFUSION THERAPY | Facility: HOSPITAL | Age: 79
Discharge: HOME | End: 2024-02-07
Admitting: PSYCHIATRY & NEUROLOGY
Payer: MEDICARE

## 2024-02-07 DIAGNOSIS — M21.371 RIGHT FOOT DROP: ICD-10-CM

## 2024-02-07 DIAGNOSIS — G60.9 PERIPHERAL NEUROPATHY, IDIOPATHIC: ICD-10-CM

## 2024-02-07 DIAGNOSIS — M21.372 FOOT DROP, LEFT: ICD-10-CM

## 2024-02-07 DIAGNOSIS — R20.2 NUMBNESS AND TINGLING IN BOTH HANDS: ICD-10-CM

## 2024-02-07 DIAGNOSIS — R20.0 NUMBNESS AND TINGLING IN BOTH HANDS: ICD-10-CM

## 2024-02-07 PROCEDURE — 95886 MUSC TEST DONE W/N TEST COMP: CPT | Performed by: PSYCHIATRY & NEUROLOGY

## 2024-02-07 PROCEDURE — 95886 MUSC TEST DONE W/N TEST COMP: CPT

## 2024-02-07 PROCEDURE — 95913 NRV CNDJ TEST 13/> STUDIES: CPT | Performed by: PSYCHIATRY & NEUROLOGY

## 2024-02-07 PROCEDURE — 95913 NRV CNDJ TEST 13/> STUDIES: CPT

## 2024-02-07 NOTE — PROCEDURES
"EMG and Nerve Conduction Studies    I.      Instrument used: Neuromax 1002  II.     Please see data sheets for tabular summary of NCS and details on methods, temperatures and lab standards.   III.    EMG muscles tested for upper extremity studies include the deltoid, biceps, triceps, pronator teres, extensor digitorum communis, first dorsal interosseous and abductor pollicis brevis.    IV.   EMG muscles tested for lower extremity studies include the vastus lateralis, tibialis anterior, peroneus longus, medial gastrocnemius and extensor digitorum brevis.    V.    Additional muscles tested as needed.  Paraspinal muscles tested as needed.   VI.   Please see data sheets for tabular summary of EMG findings.   VII. The complete report includes the data sheets.      Indication: Bilateral arms and legs numbness and weakness.  Left leg worse than right.  Approximately 2 years ago had a C4-7 ACDF with no difference postop.  Chronic low back pain without surgery.  No history of diabetes.  Patient has thyroid nodules.        Ht: 63 inches  Wt: 158 pounds  HbA1C: No results found for: \"HGBA1C\"  TSH: No results found for: \"TSH\"    Technical summary:  Nerve conduction studies were obtained in all 4 extremities.  Skin temperatures were low and attempts at warming the hands and feet were only partially successful.  Temperature correction was used if indicated.  Needle examination was obtained on selected muscles in all 4 extremities.  Cervical and thoracic paraspinals were also obtained    Results:  1.  Prolonged median antidromic sensory distal latencies bilaterally at 3.8 ms on the left with temperature correction and 3.7 ms on the right with normal amplitudes.  2.  Prolonged ulnar antidromic sensory distal latencies bilaterally at 4.2 ms on the left with temperature correction and 4.4 ms on the right.  Low amplitude on the left at 11.3 µV but normal on the right.  3.  Normal right radial sensory distal latency with normal " amplitude of 16 µV.  4.  Prolonged left median motor distal latency with temperature correction at 5.2 ms with slow velocity of 41.1 m/s and very low amplitude of 0.333 mV from wrist stimulation.  Prolonged right median motor distal latency at 5.5 ms with slow velocity of 36.8 ms and very low amplitude of 0.317 mV from wrist stimulation.  No clear evidence of conduction block at the wrist.  5.  Slow ulnar motor conduction velocities in the short segments across the elbows bilaterally at 41.7 m/s on the left and 47.6 m/s on the right with normal velocities below the elbows.  Normal distal latencies with a low amplitude on the left and 4.1 mV but normal on the right.  No significant evidence of motor conduction block proximally.  6.  Prolonged right sural sensory distal latency at 4.3 ms with low amplitude of 3.3 µV.  7.  Prolonged superficial peroneal sensory distal latencies bilaterally at 4.3 ms on the left with temperature correction and 4.5 ms on the right with very low amplitudes of 1.3 µV on the left and 3 µV on the right.  8.  Absent peroneal motor potentials from proximal and distal stimulation sites recorded over the extensor digitorum brevis muscles bilaterally.  The studies were repeated recording over the tibialis anterior muscles but there was no response on the left with a slow velocity on the right across the fibular head at 35.3 m/s with very low amplitude of 0.383 mV from the fibular head without evidence of motor conduction block proximally.  9.  Absent tibial motor potentials bilaterally recorded over the abductor halicus muscles.  10.  Needle examination of selected muscles in all 4 extremities showed multiple abnormalities.  In the upper extremities there were fibrillations and positive sharp waves in the abductor pollicis brevis and first dorsal interosseous muscles bilaterally with an increased number of large motor units with very rapid firing rates and reduced interference patterns.  Multiple  additional muscles including the pronator teres, triceps and biceps muscles showed positive sharp waves with an increased number of large motor units increased firing rate and reduced interference pattern.  The deltoid muscles showed normal insertional activities motor units and essentially full interference patterns.  Cervical paraspinals at C6/7 showed no abnormality on either side.    In the lower extremities there were fibrillations and/or positive sharp waves in all muscles studied.  There was an increased number of large motor units in both vastus lateralis muscles and in the right tibialis anterior, peroneus longus, medial gastrocnemius and extensor digitorum brevis.  There were increased firing rates and reduced interference patterns in these muscles.  On the left these muscles showed no motor units.  Lumbar paraspinals were not done.    Thoracic paraspinals between about T8 and T10 showed fibrillations and positive sharp waves on both sides    Impression:  Complex abnormal study.  There is a sensorimotor polyneuropathy with much worse motor involvement.  The motor involvement includes significantly low amplitudes with widespread profuse denervation and reinnervation changes in multiple muscles tested.  In some instances the needle exam changes were far worse than expected based on the nerve conduction study and so superimposed multiple radiculopathies are suggested.  The additional presence of thoracic paraspinal denervation potentials however suggest motor neuron disease.  In the upper extremity studies motor conduction block was not suggested.  In the lower extremity studies there were multiple unresponsive nerves and so conduction block could not be assessed.    Tuan Mcclure M.D.              Dictated utilizing Dragon dictation.

## 2024-02-14 ENCOUNTER — OFFICE VISIT (OUTPATIENT)
Dept: NEUROLOGY | Facility: CLINIC | Age: 79
End: 2024-02-14
Payer: MEDICARE

## 2024-02-14 VITALS
OXYGEN SATURATION: 94 % | BODY MASS INDEX: 29.77 KG/M2 | HEIGHT: 63 IN | DIASTOLIC BLOOD PRESSURE: 86 MMHG | HEART RATE: 75 BPM | SYSTOLIC BLOOD PRESSURE: 124 MMHG

## 2024-02-14 DIAGNOSIS — M21.371 RIGHT FOOT DROP: ICD-10-CM

## 2024-02-14 DIAGNOSIS — G12.20 MOTOR NEURON DISEASE: ICD-10-CM

## 2024-02-14 DIAGNOSIS — G62.9 POLYNEUROPATHY: ICD-10-CM

## 2024-02-14 DIAGNOSIS — R26.89 IMPAIRMENT OF BALANCE: Primary | ICD-10-CM

## 2024-02-14 DIAGNOSIS — M21.372 FOOT DROP, LEFT: ICD-10-CM

## 2024-02-14 PROCEDURE — 99214 OFFICE O/P EST MOD 30 MIN: CPT | Performed by: PSYCHIATRY & NEUROLOGY

## 2024-02-14 PROCEDURE — 3079F DIAST BP 80-89 MM HG: CPT | Performed by: PSYCHIATRY & NEUROLOGY

## 2024-02-14 PROCEDURE — 1160F RVW MEDS BY RX/DR IN RCRD: CPT | Performed by: PSYCHIATRY & NEUROLOGY

## 2024-02-14 PROCEDURE — 3074F SYST BP LT 130 MM HG: CPT | Performed by: PSYCHIATRY & NEUROLOGY

## 2024-02-14 PROCEDURE — 1159F MED LIST DOCD IN RCRD: CPT | Performed by: PSYCHIATRY & NEUROLOGY

## 2024-02-21 ENCOUNTER — TELEPHONE (OUTPATIENT)
Dept: NEUROLOGY | Facility: CLINIC | Age: 79
End: 2024-02-21
Payer: MEDICARE

## 2024-02-21 NOTE — TELEPHONE ENCOUNTER
Faxed demographics to [facility]. Needed to get pt into their system. Successful fax confirmation received.

## 2024-03-28 DIAGNOSIS — I65.23 BILATERAL CAROTID ARTERY STENOSIS: Primary | ICD-10-CM

## 2024-04-10 ENCOUNTER — TELEPHONE (OUTPATIENT)
Dept: NEUROLOGY | Facility: CLINIC | Age: 79
End: 2024-04-10

## 2024-11-14 ENCOUNTER — OFFICE VISIT (OUTPATIENT)
Age: 79
End: 2024-11-14
Payer: MEDICARE

## 2024-11-14 ENCOUNTER — HOSPITAL ENCOUNTER (OUTPATIENT)
Facility: HOSPITAL | Age: 79
Discharge: HOME | End: 2024-11-14
Admitting: SURGERY
Payer: MEDICARE

## 2024-11-14 VITALS
HEIGHT: 63 IN | BODY MASS INDEX: 26.05 KG/M2 | WEIGHT: 147 LBS | DIASTOLIC BLOOD PRESSURE: 82 MMHG | SYSTOLIC BLOOD PRESSURE: 144 MMHG

## 2024-11-14 DIAGNOSIS — I65.23 CAROTID STENOSIS, BILATERAL: ICD-10-CM

## 2024-11-14 DIAGNOSIS — I65.23 BILATERAL CAROTID ARTERY STENOSIS: ICD-10-CM

## 2024-11-14 DIAGNOSIS — I65.23 BILATERAL CAROTID ARTERY STENOSIS: Primary | ICD-10-CM

## 2024-11-14 PROCEDURE — 93880 EXTRACRANIAL BILAT STUDY: CPT

## 2024-11-14 RX ORDER — RILUZOLE 50 MG/1
50 TABLET, FILM COATED ORAL
COMMUNITY
Start: 2024-07-11

## 2024-11-14 RX ORDER — EDARAVONE 105 MG/5ML
KIT ORAL
COMMUNITY
Start: 2024-06-01

## 2024-11-14 NOTE — PROGRESS NOTES
Chief Complaint  Carotid Artery Disease    Subjective        Leandra Wesley presents to [facility]  HPI   Leandra Wesley is a 78 y.o. female that has been followed in our office by Dr. Lemus for carotid artery stenosis. She returns today in follow up along with a carotid duplex. 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 She also had cervical spine surgery.   She denies any symptoms consistent with CVA, TIA, or amaurosis fugax.     Review of Systems   Constitutional:  Negative for fever.   Eyes:  Negative for visual disturbance.   Cardiovascular:  Negative for leg swelling.   Gastrointestinal:  Negative for abdominal pain.   Musculoskeletal:  Negative for back pain.   Skin:  Negative for color change, pallor and wound.   Neurological:  Negative for dizziness, facial asymmetry, speech difficulty and weakness.        Leandra Wesley  reports that she has quit smoking. She has never used smokeless tobacco..        Objective   Vital Signs:  Vitals:    11/14/24 1343   BP: 144/82      Body mass index is 26.05 kg/m².   BMI is >= 25 and <30. (Overweight) The following options were offered after discussion;: information on healthy weight added to patient's after visit summary         Physical Exam  Vitals reviewed.   Constitutional:       Appearance: Normal appearance.   HENT:      Head: Normocephalic.   Cardiovascular:      Rate and Rhythm: Normal rate and regular rhythm.      Pulses: Normal pulses.           Dorsalis pedis pulses are 3+ on the right side and 3+ on the left side.        Posterior tibial pulses are 3+ on the right side and 3+ on the left side.   Pulmonary:      Effort: Pulmonary effort is normal.   Skin:     General: Skin is warm.   Neurological:      General: No focal deficit present.      Mental Status: She is alert and oriented to person, place, and time.   Psychiatric:         Mood and Affect: Mood normal.        Result Review :      Previous carotid  duplex: Less than 50% stenosis bilaterally    Carotid duplex from today: Duplex Carotid Ultrasound CAR (11/14/2024 13:27)                    Assessment and Plan     Diagnoses and all orders for this visit:    1. Bilateral carotid artery stenosis (Primary)    2. Carotid stenosis, bilateral  -     Cancel: Duplex Carotid Ultrasound CAR; Future             Patient present today for follow up of carotid artery stenosis. She is to continue her antiplatelet agent which is aspirin. We discussed adequate blood pressure control.  Due to her ALS, she has decided to stop screening.  We will see her on an as-needed basis.    Follow Up     Return in about 1 year (around 11/14/2025) for carotid duplex.  Patient was given instructions and counseling regarding her condition or for health maintenance advice. Please see specific information pulled into the AVS if appropriate.     CHRISTINA oSsa

## 2024-11-14 NOTE — PATIENT INSTRUCTIONS
"\"7-5-8-Almost None!\"  Healthy Habits Start Early    EAT 5 OR MORE SERVINGS OF VEGETABLES AND FRUITS EVERY DAY.    Help Leandra get three vegetables and two fruits each day. Red, green, yellow, orange...encourage them to try all the colors so they can enjoy different flavors and get more vitamins.    How can I help Leandra do this?  ---------------------------------------------  -BE PATIENT WITH Leandra, remember it may take 10 times before they start to like new food. So, start with small bites and just keep trying.  -Serve at least one vegetable or fruit at every meal. Even try two. Remember, portions do not have to be as big as you think.  -Encourage eating fruits and vegetables instead of drinking them..it's a better way to get fiber and vitamins..so limit the amount of juice to 1/2 cup per day for children 1-6 years and one cup per day for children 7-18 years of age. Try using 1/2 part water and 1/2 part juice.    Spend less than two hours per day watching television and other screen media. Screen media includes video games, movies and computer use for entertainment.    How can I help Leandra do this?  -Turn off the TV at dinner. Dinner is the best time to hang out with your kids and just talk, learn about their day, and tell them about your day. Your kids have a lot to learn from you and dinner is a great time to share.  "

## 2024-11-18 LAB
BH CV XLRA MEAS LEFT CAROTID BULB EDV: -19.9 CM/SEC
BH CV XLRA MEAS LEFT CAROTID BULB PSV: -89.2 CM/SEC
BH CV XLRA MEAS LEFT DIST CCA EDV: -28.6 CM/SEC
BH CV XLRA MEAS LEFT DIST CCA PSV: -91 CM/SEC
BH CV XLRA MEAS LEFT DIST ICA EDV: -32.9 CM/SEC
BH CV XLRA MEAS LEFT DIST ICA PSV: -101.4 CM/SEC
BH CV XLRA MEAS LEFT ICA/CCA RATIO: 1.45
BH CV XLRA MEAS LEFT MID CCA EDV: 29.5 CM/SEC
BH CV XLRA MEAS LEFT MID CCA PSV: 96.2 CM/SEC
BH CV XLRA MEAS LEFT MID ICA EDV: -42.5 CM/SEC
BH CV XLRA MEAS LEFT MID ICA PSV: -131.7 CM/SEC
BH CV XLRA MEAS LEFT PROX CCA EDV: 22.5 CM/SEC
BH CV XLRA MEAS LEFT PROX CCA PSV: 113.5 CM/SEC
BH CV XLRA MEAS LEFT PROX ECA EDV: -21.7 CM/SEC
BH CV XLRA MEAS LEFT PROX ECA PSV: -119.6 CM/SEC
BH CV XLRA MEAS LEFT PROX ICA EDV: -27.7 CM/SEC
BH CV XLRA MEAS LEFT PROX ICA PSV: -96.2 CM/SEC
BH CV XLRA MEAS LEFT PROX SCLA PSV: 152.6 CM/SEC
BH CV XLRA MEAS LEFT VERTEBRAL A EDV: -12.3 CM/SEC
BH CV XLRA MEAS LEFT VERTEBRAL A PSV: -42.8 CM/SEC
BH CV XLRA MEAS RIGHT CAROTID BULB EDV: -24.3 CM/SEC
BH CV XLRA MEAS RIGHT CAROTID BULB PSV: -97.9 CM/SEC
BH CV XLRA MEAS RIGHT DIST CCA EDV: -27.7 CM/SEC
BH CV XLRA MEAS RIGHT DIST CCA PSV: -91 CM/SEC
BH CV XLRA MEAS RIGHT DIST ICA EDV: -21.7 CM/SEC
BH CV XLRA MEAS RIGHT DIST ICA PSV: -79.2 CM/SEC
BH CV XLRA MEAS RIGHT ICA/CCA RATIO: 1.42
BH CV XLRA MEAS RIGHT MID CCA EDV: 26 CM/SEC
BH CV XLRA MEAS RIGHT MID CCA PSV: 104.8 CM/SEC
BH CV XLRA MEAS RIGHT MID ICA EDV: -21 CM/SEC
BH CV XLRA MEAS RIGHT MID ICA PSV: -89.7 CM/SEC
BH CV XLRA MEAS RIGHT PROX CCA EDV: -22 CM/SEC
BH CV XLRA MEAS RIGHT PROX CCA PSV: -141.5 CM/SEC
BH CV XLRA MEAS RIGHT PROX ECA EDV: 25.1 CM/SEC
BH CV XLRA MEAS RIGHT PROX ECA PSV: 102.2 CM/SEC
BH CV XLRA MEAS RIGHT PROX ICA EDV: 32.9 CM/SEC
BH CV XLRA MEAS RIGHT PROX ICA PSV: 128.8 CM/SEC
BH CV XLRA MEAS RIGHT PROX SCLA PSV: -164.6 CM/SEC
BH CV XLRA MEAS RIGHT VERTEBRAL A EDV: -9.3 CM/SEC
BH CV XLRA MEAS RIGHT VERTEBRAL A PSV: -39.3 CM/SEC
LEFT ARM BP: NORMAL MMHG
RIGHT ARM BP: NORMAL MMHG

## (undated) DEVICE — PENCL E/S ULTRAVAC TELESCP NOSE HOLSTR 10FT

## (undated) DEVICE — NEEDLE, QUINCKE, 18GX3.5": Brand: MEDLINE

## (undated) DEVICE — TRAP FLD MINIVAC MEGADYNE 100ML

## (undated) DEVICE — GLV SURG SENSICARE PI LF PF 8 GRN STRL

## (undated) DEVICE — SPNG DISECTOR KTNER XRAY COTN 1/4X9/16IN PK/5

## (undated) DEVICE — PK NEURO SPINE 40

## (undated) DEVICE — LIMB HOLDER, WRIST/ANKLE: Brand: DEROYAL

## (undated) DEVICE — 3M™ STERI-STRIP™ REINFORCED ADHESIVE SKIN CLOSURES, R1547, 1/2 IN X 4 IN (12 MM X 100 MM), 6 STRIPS/ENVELOPE: Brand: 3M™ STERI-STRIP™

## (undated) DEVICE — STPLR SKIN VISISTAT WD 35CT

## (undated) DEVICE — 1000 S-DRAPE TOWEL DRAPE 10/BX: Brand: STERI-DRAPE™

## (undated) DEVICE — PATIENT RETURN ELECTRODE, SINGLE-USE, CONTACT QUALITY MONITORING, ADULT, WITH 9FT CORD, FOR PATIENTS WEIGING OVER 33LBS. (15KG): Brand: MEGADYNE

## (undated) DEVICE — DRP MICROSCOPE 4 BINOCULAR CV 54X150IN

## (undated) DEVICE — Device

## (undated) DEVICE — DRSNG SLVR/ANTIBAC PRIMASEAL POST/OP ADHS 3.5X10IN

## (undated) DEVICE — IMPLANTABLE DEVICE
Type: IMPLANTABLE DEVICE | Site: SPINE CERVICAL | Status: NON-FUNCTIONAL
Removed: 2021-10-13

## (undated) DEVICE — CODMAN® SURGICAL PATTIES 1/2" X 1" (1.27CM X 2.54CM): Brand: CODMAN®

## (undated) DEVICE — POOLE SUCTION HANDLE: Brand: CARDINAL HEALTH

## (undated) DEVICE — PIN DISTRACT TI 12MM STRL

## (undated) DEVICE — TOWEL,OR,DSP,ST,BLUE,STD,4/PK,20PK/CS: Brand: MEDLINE

## (undated) DEVICE — ANTIBACTERIAL UNDYED BRAIDED (POLYGLACTIN 910), SYNTHETIC ABSORBABLE SUTURE: Brand: COATED VICRYL

## (undated) DEVICE — DISPOSABLE BIPOLAR CABLE 12FT. (3.6M): Brand: KIRWAN

## (undated) DEVICE — TOOL MR8-14MH30 MR8 14CM MATCH 3MM: Brand: MIDAS REX MR8

## (undated) DEVICE — SUT MNCRYL PLS ANTIB UD 4/0 PS2 18IN

## (undated) DEVICE — SUT VIC 0 SH 27IN J418H

## (undated) DEVICE — DRN JP FLT NO TROC SIL FUL/PERF 7MM

## (undated) DEVICE — CONN TBG Y 5 IN 1 LF STRL

## (undated) DEVICE — MEDI-VAC YANKAUER SUCTION HANDLE W/BULBOUS TIP: Brand: CARDINAL HEALTH

## (undated) DEVICE — GLV SURG BIOGEL LTX PF 8

## (undated) DEVICE — SMOKE EVACUATION TUBING WITH 7/8 IN TO 1/4 IN REDUCER: Brand: BUFFALO FILTER

## (undated) DEVICE — ELECTRD BLD EZ CLN MOD XLNG 2.75IN